# Patient Record
Sex: FEMALE | Race: WHITE | NOT HISPANIC OR LATINO | Employment: FULL TIME | URBAN - METROPOLITAN AREA
[De-identification: names, ages, dates, MRNs, and addresses within clinical notes are randomized per-mention and may not be internally consistent; named-entity substitution may affect disease eponyms.]

---

## 2014-02-21 LAB — PAP SMEAR - HIM PATIENT REPORTED: NEGATIVE

## 2017-11-10 ENCOUNTER — AMBULATORY - HEALTHEAST (OUTPATIENT)
Dept: MULTI SPECIALTY CLINIC | Facility: CLINIC | Age: 27
End: 2017-11-10

## 2017-11-10 LAB
ABO + RH BLD: NORMAL
ABO + RH BLD: NORMAL
BLD GP AB SCN SERPL QL: NEGATIVE
C TRACH DNA SPEC QL PROBE+SIG AMP: NEGATIVE
HBA1C MFR BLD: 5.1 % (ref 0–5.7)
HBV SURFACE AG SERPL QL IA: NEGATIVE
HCT VFR BLD AUTO: 40.6 %
HEMOGLOBIN: 13.9 G/DL (ref 11.7–15.7)
HIV 1+2 AB+HIV1 P24 AG SERPL QL IA: NEGATIVE
N GONORRHOEA DNA SPEC QL PROBE+SIG AMP: NEGATIVE
PAP SMEAR - HIM PATIENT REPORTED: NORMAL
PAP-ABSTRACT: NORMAL
PLATELET # BLD AUTO: 381 10^9/L
RUBELLA ABY IGG: NORMAL
SPECIMEN DESCRIP: NORMAL
SPECIMEN DESCRIPTION: NORMAL
T PALLIDUM IGG SER QL: NORMAL

## 2017-12-03 ENCOUNTER — HEALTH MAINTENANCE LETTER (OUTPATIENT)
Age: 27
End: 2017-12-03

## 2018-01-26 ENCOUNTER — PRE VISIT (OUTPATIENT)
Dept: MATERNAL FETAL MEDICINE | Facility: CLINIC | Age: 28
End: 2018-01-26

## 2018-02-02 ENCOUNTER — HOSPITAL ENCOUNTER (OUTPATIENT)
Dept: ULTRASOUND IMAGING | Facility: CLINIC | Age: 28
Discharge: HOME | End: 2018-02-02
Attending: OBSTETRICS & GYNECOLOGY | Admitting: OBSTETRICS & GYNECOLOGY
Payer: COMMERCIAL

## 2018-02-02 ENCOUNTER — OFFICE VISIT (OUTPATIENT)
Dept: MATERNAL FETAL MEDICINE | Facility: CLINIC | Age: 28
End: 2018-02-02
Attending: OBSTETRICS & GYNECOLOGY
Payer: COMMERCIAL

## 2018-02-02 ENCOUNTER — HOSPITAL ENCOUNTER (OUTPATIENT)
Dept: ULTRASOUND IMAGING | Facility: CLINIC | Age: 28
End: 2018-02-02
Attending: OBSTETRICS & GYNECOLOGY
Payer: COMMERCIAL

## 2018-02-02 DIAGNOSIS — O26.90 PREGNANCY RELATED CONDITION, UNSPECIFIED TRIMESTER: ICD-10-CM

## 2018-02-02 DIAGNOSIS — O09.812 PREGNANCY RESULTING FROM IN VITRO FERTILIZATION, SECOND TRIMESTER: Primary | ICD-10-CM

## 2018-02-02 PROCEDURE — 76811 OB US DETAILED SNGL FETUS: CPT

## 2018-02-02 PROCEDURE — 76825 ECHO EXAM OF FETAL HEART: CPT

## 2018-02-02 NOTE — PROGRESS NOTES
Please see full imaging report from ViewPoint program under imaging tab.    Normal fetal anatomic survey and fetal echo.     Deanne Kerr MD  Maternal Fetal Medicine

## 2018-02-02 NOTE — MR AVS SNAPSHOT
After Visit Summary   2/2/2018    Vivien Quinn    MRN: [id number]           Patient Information     Date Of Birth          1990        Visit Information        Provider Department      2/2/2018 12:15 PM Deanne Kerr MD [facility] Maternal Fetal Medicine [facility]        Today's Diagnoses     Pregnancy resulting from in vitro fertilization, second trimester    -  1       Follow-ups after your visit        Who to contact     If you have questions or need follow up information about today's clinic visit or your schedule please contact [facility] MATERNAL FETAL MEDICINE [facility] directly at [phone].  Normal or non-critical lab and imaging results will be communicated to you by "[employer]"hart, letter or phone within 4 business days after the clinic has received the results. If you do not hear from us within 7 days, please contact the clinic through [employer] Son[employer] or phone. If you have a critical or abnormal lab result, we will notify you by phone as soon as possible.  Submit refill requests through Spreetales or call your pharmacy and they will forward the refill request to us. Please allow 3 business days for your refill to be completed.          Additional Information About Your Visit        MyChart Information     Spreetales gives you secure access to your electronic health record. If you see a primary care provider, you can also send messages to your care team and make appointments. If you have questions, please call your primary care clinic.  If you do not have a primary care provider, please call [phone] and they will assist you.        Care EveryWhere ID     This is your Care EveryWhere ID. This could be used by other organizations to access your [place] medical records  [id number]        Your Vitals Were     Last Period                   09/04/2017            Blood Pressure from Last 3 Encounters:   10/28/15 132/78   02/02/15 98/60   08/16/13 126/76    Weight from Last 3 Encounters:   10/28/15  70.2 kg (154 lb 12.8 oz)   02/02/15 65.3 kg (144 lb)   08/16/13 66.7 kg (147 lb)              Today, you had the following     No orders found for display       Primary Care Provider Office Phone # Fax #    KIKI Daigle [phone] [address] [facility] [address]        Equal Access to Services     TROY BACA : Hadii aad ku hadasho Soomaali, waaxda luqadaha, qaybta kaalmada adeegyada, waxay idiin hayaan adeeg kharash laesperanza . So [facility] [phone].    ATENCIÓN: Si shabbirla espprachi, tiene a patel disposición servicios gratuitos de asistencia lingüística. Llame al [phone].    We comply with applicable federal civil rights laws and [place] laws. We do not discriminate on the basis of race, color, national origin, age, disability, sex, sexual orientation, or gender identity.            Thank you!     Thank you for choosing [facility] MATERNAL FETAL MEDICINE [facility]  for your care. Our goal is always to provide you with excellent care. Hearing back from our patients is one way we can continue to improve our services. Please take a few minutes to complete the written survey that you may receive in the mail after your visit with us. Thank you!             Your Updated Medication List - Protect others around you: Learn how to safely use, store and throw away your medicines at www.disposemymeds.org.          This list is accurate as of 2/2/18  1:22 PM.  Always use your most recent med list.                   Brand Name Dispense Instructions for use Diagnosis    hydrOXYzine 10 MG tablet    ATARAX     Take 10 mg by mouth 3 times daily as needed for itching        MICROGESTIN 1-20 MG-MCG per tablet   Generic drug:  norethindrone-ethinyl estradiol      Take 1 tablet by mouth daily    Routine general medical examination at a health care facility       PREVACID 24HR PO      2 in am 2 in pm    Routine general medical examination at a health care facility       sertraline 25 MG tablet     ZOLOFT     Take 25 mg by mouth daily

## 2018-02-08 ENCOUNTER — OFFICE VISIT - HEALTHEAST (OUTPATIENT)
Dept: FAMILY MEDICINE | Facility: CLINIC | Age: 28
End: 2018-02-08

## 2018-02-08 DIAGNOSIS — H00.019 STYE: ICD-10-CM

## 2018-03-16 LAB
GLU GEST SCREEN 1HR 50G: 106
HEMOGLOBIN: 11.2 G/DL (ref 11.7–15.7)
T PALLIDUM IGG SER QL: NORMAL

## 2018-04-13 ENCOUNTER — TRANSFERRED RECORDS (OUTPATIENT)
Dept: FAMILY MEDICINE | Facility: CLINIC | Age: 28
End: 2018-04-13

## 2018-05-10 LAB — GROUP B STREP PCR: NEGATIVE

## 2018-06-12 ENCOUNTER — HOSPITAL ENCOUNTER (INPATIENT)
Facility: CLINIC | Age: 28
LOS: 3 days | Discharge: HOME | End: 2018-06-15
Attending: REGISTERED NURSE | Admitting: REGISTERED NURSE
Payer: COMMERCIAL

## 2018-06-12 ENCOUNTER — ANESTHESIA (OUTPATIENT)
Dept: OBGYN | Facility: CLINIC | Age: 28
End: 2018-06-12
Payer: COMMERCIAL

## 2018-06-12 ENCOUNTER — ANESTHESIA EVENT (OUTPATIENT)
Dept: OBGYN | Facility: CLINIC | Age: 28
End: 2018-06-12
Payer: COMMERCIAL

## 2018-06-12 LAB
ABO + RH BLD: NORMAL
ABO + RH BLD: NORMAL
RUPTURE OF FETAL MEMBRANES BY ROM PLUS: POSITIVE
SPECIMEN EXP DATE BLD: NORMAL
T PALLIDUM AB SER QL: NONREACTIVE

## 2018-06-12 PROCEDURE — 25000132 ZZH RX MED GY IP 250 OP 250 PS 637: Performed by: REGISTERED NURSE

## 2018-06-12 PROCEDURE — 3E0R3BZ INTRODUCTION OF ANESTHETIC AGENT INTO SPINAL CANAL, PERCUTANEOUS APPROACH: ICD-10-PCS | Performed by: ANESTHESIOLOGY

## 2018-06-12 PROCEDURE — 59025 FETAL NON-STRESS TEST: CPT

## 2018-06-12 PROCEDURE — 25000128 H RX IP 250 OP 636: Performed by: REGISTERED NURSE

## 2018-06-12 PROCEDURE — 36415 COLL VENOUS BLD VENIPUNCTURE: CPT | Performed by: REGISTERED NURSE

## 2018-06-12 PROCEDURE — G0463 HOSPITAL OUTPT CLINIC VISIT: HCPCS | Mod: 25

## 2018-06-12 PROCEDURE — 27110038 ZZH RX 271: Performed by: ANESTHESIOLOGY

## 2018-06-12 PROCEDURE — 86900 BLOOD TYPING SEROLOGIC ABO: CPT | Performed by: REGISTERED NURSE

## 2018-06-12 PROCEDURE — 86901 BLOOD TYPING SEROLOGIC RH(D): CPT | Performed by: REGISTERED NURSE

## 2018-06-12 PROCEDURE — 25000128 H RX IP 250 OP 636: Performed by: ANESTHESIOLOGY

## 2018-06-12 PROCEDURE — 84112 EVAL AMNIOTIC FLUID PROTEIN: CPT | Performed by: REGISTERED NURSE

## 2018-06-12 PROCEDURE — 12000029 ZZH R&B OB INTERMEDIATE

## 2018-06-12 PROCEDURE — 37000011 ZZH ANESTHESIA WARD SERVICE

## 2018-06-12 PROCEDURE — 00HU33Z INSERTION OF INFUSION DEVICE INTO SPINAL CANAL, PERCUTANEOUS APPROACH: ICD-10-PCS | Performed by: ANESTHESIOLOGY

## 2018-06-12 PROCEDURE — 86780 TREPONEMA PALLIDUM: CPT | Performed by: REGISTERED NURSE

## 2018-06-12 RX ORDER — SODIUM CHLORIDE, SODIUM LACTATE, POTASSIUM CHLORIDE, CALCIUM CHLORIDE 600; 310; 30; 20 MG/100ML; MG/100ML; MG/100ML; MG/100ML
INJECTION, SOLUTION INTRAVENOUS CONTINUOUS
Status: DISCONTINUED | OUTPATIENT
Start: 2018-06-12 | End: 2018-06-13

## 2018-06-12 RX ORDER — ONDANSETRON 2 MG/ML
4 INJECTION INTRAMUSCULAR; INTRAVENOUS EVERY 6 HOURS PRN
Status: DISCONTINUED | OUTPATIENT
Start: 2018-06-12 | End: 2018-06-13

## 2018-06-12 RX ORDER — CARBOPROST TROMETHAMINE 250 UG/ML
250 INJECTION, SOLUTION INTRAMUSCULAR
Status: DISCONTINUED | OUTPATIENT
Start: 2018-06-12 | End: 2018-06-13

## 2018-06-12 RX ORDER — ACETAMINOPHEN 325 MG/1
650 TABLET ORAL EVERY 4 HOURS PRN
Status: DISCONTINUED | OUTPATIENT
Start: 2018-06-12 | End: 2018-06-13

## 2018-06-12 RX ORDER — FENTANYL CITRATE 50 UG/ML
50-100 INJECTION, SOLUTION INTRAMUSCULAR; INTRAVENOUS
Status: DISCONTINUED | OUTPATIENT
Start: 2018-06-12 | End: 2018-06-13

## 2018-06-12 RX ORDER — OXYTOCIN 10 [USP'U]/ML
10 INJECTION, SOLUTION INTRAMUSCULAR; INTRAVENOUS
Status: DISCONTINUED | OUTPATIENT
Start: 2018-06-12 | End: 2018-06-13

## 2018-06-12 RX ORDER — OXYTOCIN/0.9 % SODIUM CHLORIDE 30/500 ML
100-340 PLASTIC BAG, INJECTION (ML) INTRAVENOUS CONTINUOUS PRN
Status: COMPLETED | OUTPATIENT
Start: 2018-06-12 | End: 2018-06-13

## 2018-06-12 RX ORDER — PANTOPRAZOLE SODIUM 40 MG/1
40 TABLET, DELAYED RELEASE ORAL
Status: DISCONTINUED | OUTPATIENT
Start: 2018-06-12 | End: 2018-06-15 | Stop reason: HOSPADM

## 2018-06-12 RX ORDER — OXYCODONE AND ACETAMINOPHEN 5; 325 MG/1; MG/1
1 TABLET ORAL
Status: DISCONTINUED | OUTPATIENT
Start: 2018-06-12 | End: 2018-06-13

## 2018-06-12 RX ORDER — PRENATAL VIT/IRON FUM/FOLIC AC 27MG-0.8MG
1 TABLET ORAL DAILY
COMMUNITY

## 2018-06-12 RX ORDER — IBUPROFEN 400 MG/1
800 TABLET, FILM COATED ORAL
Status: COMPLETED | OUTPATIENT
Start: 2018-06-12 | End: 2018-06-13

## 2018-06-12 RX ORDER — FENTANYL CITRATE 50 UG/ML
100 INJECTION, SOLUTION INTRAMUSCULAR; INTRAVENOUS ONCE
Status: COMPLETED | OUTPATIENT
Start: 2018-06-12 | End: 2018-06-12

## 2018-06-12 RX ORDER — ONDANSETRON 4 MG/1
4 TABLET, ORALLY DISINTEGRATING ORAL EVERY 6 HOURS PRN
Status: DISCONTINUED | OUTPATIENT
Start: 2018-06-12 | End: 2018-06-13

## 2018-06-12 RX ORDER — METHYLERGONOVINE MALEATE 0.2 MG/ML
200 INJECTION INTRAVENOUS
Status: DISCONTINUED | OUTPATIENT
Start: 2018-06-12 | End: 2018-06-13

## 2018-06-12 RX ORDER — ROPIVACAINE HYDROCHLORIDE 2 MG/ML
10 INJECTION, SOLUTION EPIDURAL; INFILTRATION; PERINEURAL ONCE
Status: COMPLETED | OUTPATIENT
Start: 2018-06-12 | End: 2018-06-12

## 2018-06-12 RX ORDER — EPHEDRINE SULFATE 50 MG/ML
5 INJECTION, SOLUTION INTRAMUSCULAR; INTRAVENOUS; SUBCUTANEOUS
Status: DISCONTINUED | OUTPATIENT
Start: 2018-06-12 | End: 2018-06-13

## 2018-06-12 RX ORDER — SERTRALINE HYDROCHLORIDE 25 MG/1
25 TABLET, FILM COATED ORAL EVERY EVENING
Status: DISCONTINUED | OUTPATIENT
Start: 2018-06-12 | End: 2018-06-15 | Stop reason: HOSPADM

## 2018-06-12 RX ORDER — LIDOCAINE 40 MG/G
CREAM TOPICAL
Status: DISCONTINUED | OUTPATIENT
Start: 2018-06-12 | End: 2018-06-13

## 2018-06-12 RX ORDER — NALOXONE HYDROCHLORIDE 0.4 MG/ML
.1-.4 INJECTION, SOLUTION INTRAMUSCULAR; INTRAVENOUS; SUBCUTANEOUS
Status: DISCONTINUED | OUTPATIENT
Start: 2018-06-12 | End: 2018-06-13

## 2018-06-12 RX ORDER — NALBUPHINE HYDROCHLORIDE 10 MG/ML
2.5-5 INJECTION, SOLUTION INTRAMUSCULAR; INTRAVENOUS; SUBCUTANEOUS EVERY 6 HOURS PRN
Status: DISCONTINUED | OUTPATIENT
Start: 2018-06-12 | End: 2018-06-13

## 2018-06-12 RX ADMIN — SODIUM CHLORIDE, POTASSIUM CHLORIDE, SODIUM LACTATE AND CALCIUM CHLORIDE 1000 ML: 600; 310; 30; 20 INJECTION, SOLUTION INTRAVENOUS at 21:44

## 2018-06-12 RX ADMIN — ROPIVACAINE HYDROCHLORIDE 8 ML: 2 INJECTION, SOLUTION EPIDURAL; INFILTRATION at 22:45

## 2018-06-12 RX ADMIN — PANTOPRAZOLE SODIUM 40 MG: 40 TABLET, DELAYED RELEASE ORAL at 23:08

## 2018-06-12 RX ADMIN — FENTANYL CITRATE 100 MCG: 50 INJECTION, SOLUTION INTRAMUSCULAR; INTRAVENOUS at 22:45

## 2018-06-12 RX ADMIN — Medication 12 ML/HR: at 22:51

## 2018-06-12 RX ADMIN — SODIUM CHLORIDE, POTASSIUM CHLORIDE, SODIUM LACTATE AND CALCIUM CHLORIDE: 600; 310; 30; 20 INJECTION, SOLUTION INTRAVENOUS at 23:00

## 2018-06-12 RX ADMIN — FENTANYL CITRATE 100 MCG: 50 INJECTION, SOLUTION INTRAMUSCULAR; INTRAVENOUS at 21:52

## 2018-06-12 NOTE — IP AVS SNAPSHOT
MRN:[id number]                      After Visit Summary   6/12/2018    Vivien Quinn    MRN: [id number]           Thank you!     Thank you for choosing [facility] for your care. Our goal is always to provide you with excellent care. Hearing back from our patients is one way we can continue to improve our services. Please take a few minutes to complete the written survey that you may receive in the mail after you visit with us. Thank you!        Patient Information     Date Of Birth          1990        About your hospital stay     You were admitted on:  June 12, 2018 You last received care in the:  [address]    You were discharged on:  Monik 15, 2018        Reason for your hospital stay       Maternity care                  Who to Call     For medical emergencies, please call 911.  For non-urgent questions about your medical care, please call your primary care provider or clinic, [phone]          Attending Provider     Provider Specialty    Taylor Brand APRN CN[facility]wives    [facility], KELLY Chau CNM Midwives       Primary Care Provider Office Phone # Fax #    KIKI Daigle [phone]      After Care Instructions     Activity       Review discharge instructions            Diet       Resume previous diet            Discharge Instructions - Postpartum visit       Schedule postpartum visit with your provider and return to clinic in 6 weeks or sooner as needed.                  Follow-up Appointments     Follow Up and recommended labs and tests       Schedule visit in clinic at 6 weeks postpartum or sooner as needed.                  Further instructions from your care team       Postpartum Vaginal Delivery Instructions    Activity       Ask family and friends for help when you need it.    Do not place anything in your vagina for 6 weeks.    You are not restricted on other activities, but take it easy for a few  weeks to allow your body to recover from delivery.  You are able to do any activities you feel up to that point.    No driving until you have stopped taking your pain medications (usually two weeks after delivery).     Call your health care provider if you have any of these symptoms:       Increased pain, swelling, redness, or fluid around your stiches from an episiotomy or perineal tear.    A fever above 100.4 F (38 C) with or without chills when placing a thermometer under your tongue.    You soak a sanitary pad with blood within 1 hour, or you see blood clots larger than a golf ball.    Bleeding that lasts more than 6 weeks.    Vaginal discharge that smells bad.    Severe pain, cramping or tenderness in your lower belly area.    A need to urinate more frequently (use the toilet more often), more urgently (use the toilet very quickly), or it burns when you urinate.    Nausea and vomiting.    Redness, swelling or pain around a vein in your leg.    Problems breastfeeding or a red or painful area on your breast.    Chest pain and cough or are gasping for air.    Problems coping with sadness, anxiety, or depression.  If you have any concerns about hurting yourself or the baby, call your provider immediately.     You have questions or concerns after you return home.     Keep your hands clean:  Always wash your hands before touching your perineal area and stitches.  This helps reduce your risk of infection.  If your hands aren't dirty, you may use an alcohol hand-rub to clean your hands. Keep your nails clean and short.        Pending Results     No orders found from 6/10/2018 to 6/13/2018.            Statement of Approval     Ordered          06/15/18 0823  I have reviewed and agree with all the recommendations and orders detailed in this document.  EFFECTIVE NOW     Approved and electronically signed by:  Daisy Sheridan APRN CNM             Admission Information     Date & Time Provider Department Dept.  "Phone    6/12/2018 Daisy Sheridan, KELLY CNM [address] [phone]      Your Vitals Were     Blood Pressure Pulse Temperature Respirations Height Weight    116/72 79 98.3  F (36.8  C) (Oral) 16 1.702 m (5' 7\") 77.6 kg (171 lb)    Last Period Pulse Oximetry BMI (Body Mass Index)             09/04/2017 97% 26.78 kg/m2         piSociety Information     piSociety gives you secure access to your electronic health record. If you see a primary care provider, you can also send messages to your care team and make appointments. If you have questions, please call your primary care clinic.  If you do not have a primary care provider, please call [phone] and they will assist you.        Care EveryWhere ID     This is your Care EveryWhere ID. This could be used by other organizations to access your [facility] medical records  [id number]        Equal Access to Services     TROY BACA AH: Ramiro Kern, willie marquez, qayehuda kaaljose caldwell, eleazar orourke. So [facility] [phone].    ATENCIÓN: Si habla español, tiene a patel disposición servicios gratuitos de asistencia lingüística. Llame al [phone].    We comply with applicable federal civil rights laws and [place] laws. We do not discriminate on the basis of race, color, national origin, age, disability, sex, sexual orientation, or gender identity.               Review of your medicines      START taking        Dose / Directions    acetaminophen 325 MG tablet   Commonly known as:  TYLENOL        Dose:  650 mg   Take 2 tablets (650 mg) by mouth every 6 hours as needed for mild pain, fever or pain (greater than or equal to 38? C /100.4? F (oral) or 38.5? C/ 101.4? F (core).)   Quantity:  100 tablet   Refills:  0       ibuprofen 600 MG tablet   Commonly known as:  ADVIL/MOTRIN        Dose:  600 mg   Take 1 tablet (600 mg) by mouth every 6 hours as needed for moderate pain or other (cramping) "   Refills:  0       oxyCODONE IR 5 MG tablet   Commonly known as:  ROXICODONE        Dose:  5 mg   Take 1 tablet (5 mg) by mouth every 4 hours as needed for moderate to severe pain   Quantity:  15 tablet   Refills:  0       senna-docusate 8.6-50 MG per tablet   Commonly known as:  SENOKOT-S;PERICOLACE        Dose:  1 tablet   Take 1 tablet by mouth 2 times daily as needed for constipation   Quantity:  100 tablet   Refills:  0         CONTINUE these medicines which have NOT CHANGED        Dose / Directions    PANTOPRAZOLE SODIUM PO        Dose:  40 mg   Take 40 mg by mouth 2 times daily (before meals)   Refills:  0       prenatal multivitamin plus iron 27-0.8 MG Tabs per tablet        Dose:  1 tablet   Take 1 tablet by mouth daily   Refills:  0       sertraline 25 MG tablet   Commonly known as:  ZOLOFT        Dose:  25 mg   Take 25 mg by mouth daily   Refills:  0            Where to get your medicines      Some of these will need a paper prescription and others can be bought over the counter. Ask your nurse if you have questions.     Bring a paper prescription for each of these medications     oxyCODONE IR 5 MG tablet       You don't need a prescription for these medications     acetaminophen 325 MG tablet    ibuprofen 600 MG tablet    senna-docusate 8.6-50 MG per tablet                Protect others around you: Learn how to safely use, store and throw away your medicines at www.disposemymeds.org.        Information about OPIOIDS     PRESCRIPTION OPIOIDS: WHAT YOU NEED TO KNOW   We gave you an opioid (narcotic) pain medicine. It is important to manage your pain, but opioids are not always the best choice. You should first try all the other options your care team gave you. Take this medicine for as short a time (and as few doses) as possible.     These medicines have risks:    DO NOT drive when on new or higher doses of pain medicine. These medicines can affect your alertness and reaction times, and you could be  arrested for driving under the influence (DUI). If you need to use opioids long-term, talk to your care team about driving.    DO NOT operate heave machinery    DO NOT do any other dangerous activities while taking these medicines.     DO NOT drink any alcohol while taking these medicines.      If the opioid prescribed includes acetaminophen, DO NOT take with any other medicines that contain acetaminophen. Read all labels carefully. Look for the word  acetaminophen  or  Tylenol.  Ask your pharmacist if you have questions or are unsure.    You can get addicted to pain medicines, especially if you have a history of addiction (chemical, alcohol or substance dependence). Talk to your care team about ways to reduce this risk.    Store your pills in a secure place, locked if possible. We will not replace any lost or stolen medicine. If you don t finish your medicine, please throw away (dispose) as directed by your pharmacist. The [place] Pollution Control Agency has more information about safe disposal: https://www.pca.[facility].[place].us/living-green/managing-unwanted-medications.     All opioids tend to cause constipation. Drink plenty of water and eat foods that have a lot of fiber, such as fruits, vegetables, prune juice, apple juice and high-fiber cereal. Take a laxative (Miralax, milk of magnesia, Colace, Senna) if you don t move your bowels at least every other day.              Medication List: This is a list of all your medications and when to take them. Check marks below indicate your daily home schedule. Keep this list as a reference.      Medications           Morning Afternoon Evening Bedtime As Needed    acetaminophen 325 MG tablet   Commonly known as:  TYLENOL   Take 2 tablets (650 mg) by mouth every 6 hours as needed for mild pain, fever or pain (greater than or equal to 38? C /100.4? F (oral) or 38.5? C/ 101.4? F (core).)   Last time this was given:  650 mg on 6/15/2018  9:40 AM                                 ibuprofen 600 MG tablet   Commonly known as:  ADVIL/MOTRIN   Take 1 tablet (600 mg) by mouth every 6 hours as needed for moderate pain or other (cramping)   Last time this was given:  800 mg on 6/15/2018  9:40 AM                                oxyCODONE IR 5 MG tablet   Commonly known as:  ROXICODONE   Take 1 tablet (5 mg) by mouth every 4 hours as needed for moderate to severe pain   Last time this was given:  5 mg on 6/15/2018  9:41 AM                                PANTOPRAZOLE SODIUM PO   Take 40 mg by mouth 2 times daily (before meals)   Last time this was given:  40 mg on 6/15/2018  9:40 AM                                prenatal multivitamin plus iron 27-0.8 MG Tabs per tablet   Take 1 tablet by mouth daily                                senna-docusate 8.6-50 MG per tablet   Commonly known as:  SENOKOT-S;PERICOLACE   Take 1 tablet by mouth 2 times daily as needed for constipation   Last time this was given:  1 tablet on 6/15/2018  9:41 AM                                sertraline 25 MG tablet   Commonly known as:  ZOLOFT   Take 25 mg by mouth daily   Last time this was given:  25 mg on 6/14/2018  8:10 PM

## 2018-06-12 NOTE — IP AVS SNAPSHOT
[address]    [place] [address]    Phone:  [phone]                                       After Visit Summary   6/12/2018    Vivien Quinn    MRN: [id number]           After Visit Summary Signature Page     I have received my discharge instructions, and my questions have been answered. I have discussed any challenges I see with this plan with the nurse or doctor.    ..........................................................................................................................................  Patient/Patient Representative Signature      ..........................................................................................................................................  Patient Representative Print Name and Relationship to Patient    ..................................................               ................................................  Date                                            Time    ..........................................................................................................................................  Reviewed by Signature/Title    ...................................................              ..............................................  Date                                                            Time

## 2018-06-12 NOTE — PLAN OF CARE
Taylor MULLENM at bedside to discuss plan of care. Plan per provider is continue coping with repositioning and nitrous oxide, plan for CNM to reassess cervix at 1930. Plan to page Taylor if patient requests epidural before 1930. Plan agreed upon with patient and spouse.

## 2018-06-12 NOTE — H&P
[facility] Labor and Delivery History and Physical    Vivien Quinn MRN# [id number]   Age: 27 year old YOB: 1990     Date of Admission:  2018    Primary care provider: Prisca House           Chief Complaint:   Vivien Quinn is a 27 year old  who is 40w4d pregnant today and being admitted for labor management and SROM. Pt started having painful ctxs at 0400 this morning and then had gush of clear fluid at 0730 this morning. ROM PLus was positive for amniotic fluid. Pt was ctxing painfully but irregularly on admission and was 1/70/-2, posterior per RN report. Category I tracing in triage. Intermittent auscultation since admission has revealed normal FHTs.           Pregnancy history:     OBSTETRIC HISTORY: IVF pregnancy, Normal Level II US.     Obstetric History       T0      L0     SAB0   TAB0   Ectopic0   Multiple0   Live Births0       # Outcome Date GA Lbr Sanjay/2nd Weight Sex Delivery Anes PTL Lv   1 Current                   EDC: Estimated Date of Delivery: 2018    Prenatal Labs:   Lab Results   Component Value Date    ABO O 2018    RH Pos 2018    AS negative 11/10/2017    HEPBANG Negative 11/10/2017    CHPCRT Negative 11/10/2017    GCPCRT Negative 11/10/2017    TREPAB non-reactive 2018    RUBELLAABIGG IMMUNE 11/10/2017    HGB 11.2 (A) 2018       GBS Status:   Lab Results   Component Value Date    GBS negative 05/10/2018       Active Problem List  Patient Active Problem List   Diagnosis     Health Care Home     Esophageal reflux     Generalized anxiety disorder     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        Maternal Past Medical History:     Past Medical History:   Diagnosis Date     ADD (attention deficit disorder)     Concerta     Anxiety     Celexa     Gastric polyp 2015    per EGD and bx benign     GERD (gastroesophageal reflux disease) 2015    EGD     Migraine     none since highschool     PCOS (polycystic ovarian syndrome)                           Social History:   I have reviewed this patient's social history          Physical Exam:   Vitals were reviewed  Blood pressure 134/60, pulse 70, temperature 97.8  F (36.6  C), temperature source Oral, resp. rate 18, height 1.702 m (5' 7\"), weight 77.6 kg (171 lb), last menstrual period 09/04/2017.  Constitutional:   awake, alert, cooperative, no apparent distress, and appears stated age      Cervix:   Membranes: SROM  clear amniotic fluid   Dilation: 2   Effacement: 70%   Station:-2   Consistency: average   Position: Posterior  Presentation:Cephalic  FHTs: Normal auscultation per pt report.   Ctxs: q2-5min. Pt is using NO for pain control.                        Assessment:   Vivien Quinn is a 40w4d pregnant female admitted with labor management, SROM.          Plan:   Admit - see IP orders  Pt seems to be in early labor. She has changed from 1 to 2cm in 6 hours. Will recheck cervix thisevening if not indicated sooner.      Taylor Brand, KELLY MULLENM  "

## 2018-06-12 NOTE — PLAN OF CARE
Pt and  present to [facility].  Pt is  pt of Taylor Brand CNM who is 40w2d c/o annmarie and leaking fluid since 0730 this morning.    External monitors applied with verbal consent.  Orders received.  Prenatal and medical history reviewed.    ROM + collected and sent to lab.      NST reactive, ROM+ positive, cervix 1.5/70%/-2, vertex.  Taylor Brand updated.  Orders received to admit pt and allow her to ambulate in hallways.  Transferred to room 234 at 0955.

## 2018-06-13 PROCEDURE — 0KQM0ZZ REPAIR PERINEUM MUSCLE, OPEN APPROACH: ICD-10-PCS | Performed by: ADVANCED PRACTICE MIDWIFE

## 2018-06-13 PROCEDURE — 25000132 ZZH RX MED GY IP 250 OP 250 PS 637: Performed by: REGISTERED NURSE

## 2018-06-13 PROCEDURE — 25000132 ZZH RX MED GY IP 250 OP 250 PS 637: Performed by: ADVANCED PRACTICE MIDWIFE

## 2018-06-13 PROCEDURE — 72200001 ZZH LABOR CARE VAGINAL DELIVERY SINGLE

## 2018-06-13 PROCEDURE — 25000128 H RX IP 250 OP 636: Performed by: REGISTERED NURSE

## 2018-06-13 PROCEDURE — 25000128 H RX IP 250 OP 636

## 2018-06-13 PROCEDURE — 12000037 ZZH R&B POSTPARTUM INTERMEDIATE

## 2018-06-13 PROCEDURE — 25000125 ZZHC RX 250: Performed by: REGISTERED NURSE

## 2018-06-13 RX ORDER — LANOLIN 100 %
OINTMENT (GRAM) TOPICAL
Status: DISCONTINUED | OUTPATIENT
Start: 2018-06-13 | End: 2018-06-15 | Stop reason: HOSPADM

## 2018-06-13 RX ORDER — LIDOCAINE 40 MG/G
CREAM TOPICAL
Status: DISCONTINUED | OUTPATIENT
Start: 2018-06-13 | End: 2018-06-13

## 2018-06-13 RX ORDER — IBUPROFEN 400 MG/1
800 TABLET, FILM COATED ORAL EVERY 6 HOURS PRN
Status: DISCONTINUED | OUTPATIENT
Start: 2018-06-13 | End: 2018-06-15 | Stop reason: HOSPADM

## 2018-06-13 RX ORDER — AMOXICILLIN 250 MG
1 CAPSULE ORAL 2 TIMES DAILY
Status: DISCONTINUED | OUTPATIENT
Start: 2018-06-13 | End: 2018-06-15 | Stop reason: HOSPADM

## 2018-06-13 RX ORDER — OXYTOCIN/0.9 % SODIUM CHLORIDE 30/500 ML
100 PLASTIC BAG, INJECTION (ML) INTRAVENOUS CONTINUOUS
Status: DISCONTINUED | OUTPATIENT
Start: 2018-06-13 | End: 2018-06-15 | Stop reason: HOSPADM

## 2018-06-13 RX ORDER — CALCIUM CARBONATE 500 MG/1
500-1000 TABLET, CHEWABLE ORAL
Status: DISCONTINUED | OUTPATIENT
Start: 2018-06-13 | End: 2018-06-13

## 2018-06-13 RX ORDER — BISACODYL 10 MG
10 SUPPOSITORY, RECTAL RECTAL DAILY PRN
Status: DISCONTINUED | OUTPATIENT
Start: 2018-06-15 | End: 2018-06-15 | Stop reason: HOSPADM

## 2018-06-13 RX ORDER — OXYCODONE HYDROCHLORIDE 5 MG/1
5-10 TABLET ORAL EVERY 4 HOURS PRN
Status: DISCONTINUED | OUTPATIENT
Start: 2018-06-13 | End: 2018-06-15 | Stop reason: HOSPADM

## 2018-06-13 RX ORDER — ACETAMINOPHEN 325 MG/1
650 TABLET ORAL EVERY 4 HOURS PRN
Status: DISCONTINUED | OUTPATIENT
Start: 2018-06-13 | End: 2018-06-15 | Stop reason: HOSPADM

## 2018-06-13 RX ORDER — HYDROCORTISONE 2.5 %
CREAM (GRAM) TOPICAL 3 TIMES DAILY PRN
Status: DISCONTINUED | OUTPATIENT
Start: 2018-06-13 | End: 2018-06-15 | Stop reason: HOSPADM

## 2018-06-13 RX ORDER — MISOPROSTOL 200 UG/1
400 TABLET ORAL
Status: DISCONTINUED | OUTPATIENT
Start: 2018-06-13 | End: 2018-06-15 | Stop reason: HOSPADM

## 2018-06-13 RX ORDER — OXYTOCIN/0.9 % SODIUM CHLORIDE 30/500 ML
1-24 PLASTIC BAG, INJECTION (ML) INTRAVENOUS CONTINUOUS
Status: DISCONTINUED | OUTPATIENT
Start: 2018-06-13 | End: 2018-06-13

## 2018-06-13 RX ORDER — NALOXONE HYDROCHLORIDE 0.4 MG/ML
.1-.4 INJECTION, SOLUTION INTRAMUSCULAR; INTRAVENOUS; SUBCUTANEOUS
Status: DISCONTINUED | OUTPATIENT
Start: 2018-06-13 | End: 2018-06-15 | Stop reason: HOSPADM

## 2018-06-13 RX ORDER — NALBUPHINE HYDROCHLORIDE 10 MG/ML
INJECTION, SOLUTION INTRAMUSCULAR; INTRAVENOUS; SUBCUTANEOUS
Status: COMPLETED
Start: 2018-06-13 | End: 2018-06-13

## 2018-06-13 RX ORDER — OXYTOCIN 10 [USP'U]/ML
10 INJECTION, SOLUTION INTRAMUSCULAR; INTRAVENOUS
Status: DISCONTINUED | OUTPATIENT
Start: 2018-06-13 | End: 2018-06-15 | Stop reason: HOSPADM

## 2018-06-13 RX ORDER — AMOXICILLIN 250 MG
2 CAPSULE ORAL 2 TIMES DAILY
Status: DISCONTINUED | OUTPATIENT
Start: 2018-06-13 | End: 2018-06-15 | Stop reason: HOSPADM

## 2018-06-13 RX ORDER — OXYTOCIN/0.9 % SODIUM CHLORIDE 30/500 ML
340 PLASTIC BAG, INJECTION (ML) INTRAVENOUS CONTINUOUS PRN
Status: DISCONTINUED | OUTPATIENT
Start: 2018-06-13 | End: 2018-06-15 | Stop reason: HOSPADM

## 2018-06-13 RX ADMIN — CALCIUM CARBONATE (ANTACID) CHEW TAB 500 MG 1000 MG: 500 CHEW TAB at 00:19

## 2018-06-13 RX ADMIN — OXYTOCIN-SODIUM CHLORIDE 0.9% IV SOLN 30 UNIT/500ML 2 MILLI-UNITS/MIN: 30-0.9/5 SOLUTION at 00:25

## 2018-06-13 RX ADMIN — PANTOPRAZOLE SODIUM 40 MG: 40 TABLET, DELAYED RELEASE ORAL at 09:35

## 2018-06-13 RX ADMIN — OXYCODONE HYDROCHLORIDE 5 MG: 5 TABLET ORAL at 21:53

## 2018-06-13 RX ADMIN — CALCIUM CARBONATE (ANTACID) CHEW TAB 500 MG 1000 MG: 500 CHEW TAB at 08:59

## 2018-06-13 RX ADMIN — IBUPROFEN 800 MG: 400 TABLET ORAL at 18:40

## 2018-06-13 RX ADMIN — NALBUPHINE HYDROCHLORIDE 2.5 MG: 10 INJECTION, SOLUTION INTRAMUSCULAR; INTRAVENOUS; SUBCUTANEOUS at 00:25

## 2018-06-13 RX ADMIN — NALBUPHINE HYDROCHLORIDE 2.5 MG: 10 INJECTION INTRAMUSCULAR; INTRAVENOUS; SUBCUTANEOUS at 06:55

## 2018-06-13 RX ADMIN — OXYTOCIN-SODIUM CHLORIDE 0.9% IV SOLN 30 UNIT/500ML 340 ML/HR: 30-0.9/5 SOLUTION at 11:44

## 2018-06-13 RX ADMIN — NALBUPHINE HYDROCHLORIDE 2.5 MG: 10 INJECTION INTRAMUSCULAR; INTRAVENOUS; SUBCUTANEOUS at 00:25

## 2018-06-13 RX ADMIN — SERTRALINE HYDROCHLORIDE 25 MG: 25 TABLET ORAL at 20:05

## 2018-06-13 RX ADMIN — SODIUM CHLORIDE, POTASSIUM CHLORIDE, SODIUM LACTATE AND CALCIUM CHLORIDE: 600; 310; 30; 20 INJECTION, SOLUTION INTRAVENOUS at 06:51

## 2018-06-13 RX ADMIN — SENNOSIDES AND DOCUSATE SODIUM 2 TABLET: 8.6; 5 TABLET ORAL at 20:05

## 2018-06-13 RX ADMIN — FENTANYL CITRATE 100 MCG: 50 INJECTION, SOLUTION INTRAMUSCULAR; INTRAVENOUS at 12:30

## 2018-06-13 RX ADMIN — ACETAMINOPHEN 650 MG: 325 TABLET, FILM COATED ORAL at 12:54

## 2018-06-13 RX ADMIN — ACETAMINOPHEN 650 MG: 325 TABLET, FILM COATED ORAL at 18:40

## 2018-06-13 RX ADMIN — OXYCODONE HYDROCHLORIDE 5 MG: 5 TABLET ORAL at 20:45

## 2018-06-13 RX ADMIN — NALBUPHINE HYDROCHLORIDE 2.5 MG: 10 INJECTION, SOLUTION INTRAMUSCULAR; INTRAVENOUS; SUBCUTANEOUS at 06:55

## 2018-06-13 RX ADMIN — IBUPROFEN 800 MG: 400 TABLET ORAL at 12:54

## 2018-06-13 RX ADMIN — LIDOCAINE HYDROCHLORIDE 40 ML: 10 INJECTION, SOLUTION INFILTRATION; PERINEURAL at 12:32

## 2018-06-13 RX ADMIN — PANTOPRAZOLE SODIUM 40 MG: 40 TABLET, DELAYED RELEASE ORAL at 18:40

## 2018-06-13 NOTE — PLAN OF CARE
1930 JOSE M Brand at bedside for SVE. JOSE M plans to recheck patient's cervix in 2 hours and rediscuss pain management options. Currently patient using nitrous oxide on the birthing ball and states that is managing her pain.  2130 CNM at bedside for SVE. Patient requesting epidural at this time. Fluid bolus started.   2230 MDA at bedside for epidural placement. Patient tolerated procedure well and placed in left tilt position. FHT's not tracing while patient sitting for epidural from 4693-2825.  Patient able to rest some throughout the night.  0644 OMAR Brand CNM updated of SVE, FHT's, contraction pattern  0715 report given to Cayla VÁSQUEZ RN and Vikki DAVISON RN to assume cares.

## 2018-06-13 NOTE — PROGRESS NOTES
"OB Progress Note  2018  9:47 PM    S:  Pt vocalizing with ctxs. Getting tired. Reports that they are closer together.  No other complaints.      O:  /72  Pulse 80  Temp 97.9  F (36.6  C) (Oral)  Resp 20  Ht 1.702 m (5' 7\")  Wt 77.6 kg (171 lb)  LMP 2017  BMI 26.78 kg/m2  FHTs - normal per RN report.   Ctxs: q2-3min  SVE:  3.5/90%/-1, can stretch to 4, still slightly posterior  Membranes: no fluid noted on this exam.       A/P:  27 year old  @40w4d admitted for labor and SROM  1.  Routine cares  2.  Pt would like epidural. Starting fluid bolus. Once pt is comfortable will recheck cervix at midnight. If no change will start Pitocin.       Taylor Brand    "

## 2018-06-13 NOTE — PROGRESS NOTES
"OB Progress Note  2018  7:50 PM    S:  Pt coping well with ctxs control. Using NO and hot packs.  No other complaints.      O:  /79  Pulse 80  Temp 97.8  F (36.6  C) (Oral)  Resp 18  Ht 1.702 m (5' 7\")  Wt 77.6 kg (171 lb)  LMP 2017  BMI 26.78 kg/m2  FHTS: 140s per RN report.   [place]:  Ctx q2-5min  SVE:  3/90%/-1  Membranes: clear fluid.     EFW 7#12oz    A/P:  27 year old  @40w4d admitted Early Labor and SROM   1.  Routine cares  2.  Making normal progress. Discussed POC. Pt is going ok with continuing NO and trying to wait for active labor before epidural. Will recheck cervix at 21:30.      Talyor Brand    "

## 2018-06-13 NOTE — L&D DELIVERY NOTE
Delivery Summary    Vivien is a 27 year old, now  at 40w5d gestation who presented to L&D for spontaneous onset of contractions and SROM.    Pregnancy Hx: Pregnancy was complicated by:  Anxiety/depression, currently taking sertraline  Labs: Rh +, GBS neg, Rubella immune    Stage 1   Vivien presented to L&D on 18 afternoon for spontaneous onset of contractions at 04:00am and rupture of membranes at 07:30am. At the time of admission, her cervical exam was 1cm/70%/-2, leaking clear fluid with +amnisure. She was expectantly managed and progressed to 3.5cm. At this time, she requested epidural for pain control and pitocin was started for augmentation of labor at approximately  12:00am. FHR was Category 1 throughout first stage of labor with moderate variability and accelerations.  Exam revealed complete dilation at 10:27am. At this time, patient did have urge to push.    Stage 2   Patient began pushing at 10:40am on 18. She pushed with excellent maternal effort. FHR remained Category 2 during second stage with variable decelerations that quickly returned to baseline after each contraction. She achieved a normal spontaneous vaginal delivery on 18 at 11:36am of a viable female infant,  Eden , weight 8#12oz, OA to OSITO. Shoulders and body delivered without difficulty. Nuchal cord not noted. Infant with spontaneous cry and placed on maternal abdomen. Apgars 9/9 at one and five minutes. Cord clamped and cut after pulsations ceased.     Stage 3   Spontaneous delivery of complete and intact placenta with 3 vessel cord at 11:48. Cord blood obtained.  IV Pitocin administered after delivery of infant. Inspection showed right, left, and midline vaginal lacerations with extension to a second degree perineal laceration. Rectal exam revealed intact anal sphincter. Dr. Bell called to assist with repair. She repaired lacerations with 3-0 vicryl in the usual fashion under using lidocaine analgesia and  fentanyl. Laceration was well-approximated and hemostatic following repair. Total QBL 838mL with majority of blood loss occurring prior to placenta delivery. Fundus firm. Needle and sponge counts correct.  Mother and infant left stable, skin to skin, establishing breastfeeding and bonding.    Daisy Sheridan CNM  6/13/2018  1:10 PM

## 2018-06-13 NOTE — ANESTHESIA POSTPROCEDURE EVALUATION
Patient: Vivien Quinn    * No procedures listed *    Diagnosis:* No pre-op diagnosis entered *  Diagnosis Additional Information: No value filed.    Anesthesia Type:  No value filed.    Note:  Anesthesia Post Evaluation    Patient location during evaluation: Floor  Patient participation: Able to fully participate in evaluation  Level of consciousness: awake  Pain management: adequate  Airway patency: patent  Cardiovascular status: acceptable  Respiratory status: acceptable  Hydration status: acceptable  PONV: none     Anesthetic complications: None    Comments: Reportedly uncomplicated ALEX        Last vitals:  Vitals:    06/13/18 1330 06/13/18 1340 06/13/18 1355   BP: 119/57 115/58 109/65   Pulse:      Resp:      Temp:      SpO2: 99% 99% 97%         Electronically Signed By: Thalia Zafar MD  June 13, 2018  2:14 PM

## 2018-06-13 NOTE — ANESTHESIA PROCEDURE NOTES
Peripheral nerve/Neuraxial procedure note : epidural catheter  Pre-Procedure  Performed by RASHEED MALDONADO  Location: OB      Pre-Anesthestic Checklist: patient identified, IV checked, risks and benefits discussed, informed consent, monitors and equipment checked, pre-op evaluation and at physician/surgeon's request    Timeout  Correct Patient: Yes   Correct Procedure: Yes   Correct Site: Yes   Correct Laterality: N/A   Correct Position: Yes   Site Marked: N/A   .   Procedure Documentation    Diagnosis:Labor Pain.    Procedure:    Epidural catheter.  Insertion Site:L3-4  (midline approach) Injection technique: LORT saline   Local skin infiltrated with mL of 1% lidocaine.  ELLY at 5 cm     Patient Prep;mask, sterile gloves, povidone-iodine 7.5% surgical scrub, patient draped.  .  Needle: Touhy needle Needle Gauge: 17.    Needle Length (Inches) 5  # of attempts: 1 and # of redirects:  .   Catheter: 19 G . .  Catheter threaded easily  5 cm epidural space.  .   .    Assessment/Narrative  Paresthesias: No.  .  .  Aspiration negative for heme or CSF  . Test dose of 3 mL lidocaine 1.5% w/ 1:200,000 epinephrine at. Test dose negative for signs of intravascular, subdural or intrathecal injection. Comments:  Catheter secured with adhesive spray, tegaderm, and tape.

## 2018-06-13 NOTE — PLAN OF CARE
Data: Vivien Quinn transferred to [id number] via wheelchair at 1430. Baby transferred via parent's arms.  Action: Receiving unit notified of transfer: Yes. Patient and family notified of room change. Report given to Mena PULIDO at 1430. Belongings sent to receiving unit. Accompanied by Registered Nurse. Oriented patient to surroundings. Call light within reach. ID bands double-checked with receiving RN.  Response: Patient tolerated transfer and is stable.

## 2018-06-14 LAB — HGB BLD-MCNC: 9.2 G/DL (ref 11.7–15.7)

## 2018-06-14 PROCEDURE — 36415 COLL VENOUS BLD VENIPUNCTURE: CPT | Performed by: ADVANCED PRACTICE MIDWIFE

## 2018-06-14 PROCEDURE — 25000132 ZZH RX MED GY IP 250 OP 250 PS 637: Performed by: ADVANCED PRACTICE MIDWIFE

## 2018-06-14 PROCEDURE — 25000132 ZZH RX MED GY IP 250 OP 250 PS 637: Performed by: REGISTERED NURSE

## 2018-06-14 PROCEDURE — 12000037 ZZH R&B POSTPARTUM INTERMEDIATE

## 2018-06-14 PROCEDURE — 85018 HEMOGLOBIN: CPT | Performed by: ADVANCED PRACTICE MIDWIFE

## 2018-06-14 RX ORDER — CALCIUM CARBONATE 500 MG/1
1000 TABLET, CHEWABLE ORAL
Status: DISCONTINUED | OUTPATIENT
Start: 2018-06-14 | End: 2018-06-15 | Stop reason: HOSPADM

## 2018-06-14 RX ADMIN — PANTOPRAZOLE SODIUM 40 MG: 40 TABLET, DELAYED RELEASE ORAL at 16:29

## 2018-06-14 RX ADMIN — OXYCODONE HYDROCHLORIDE 10 MG: 5 TABLET ORAL at 08:20

## 2018-06-14 RX ADMIN — OXYCODONE HYDROCHLORIDE 10 MG: 5 TABLET ORAL at 14:39

## 2018-06-14 RX ADMIN — OXYCODONE HYDROCHLORIDE 5 MG: 5 TABLET ORAL at 22:35

## 2018-06-14 RX ADMIN — ACETAMINOPHEN 650 MG: 325 TABLET, FILM COATED ORAL at 22:35

## 2018-06-14 RX ADMIN — SENNOSIDES AND DOCUSATE SODIUM 2 TABLET: 8.6; 5 TABLET ORAL at 20:10

## 2018-06-14 RX ADMIN — IBUPROFEN 800 MG: 400 TABLET ORAL at 21:03

## 2018-06-14 RX ADMIN — CALCIUM CARBONATE (ANTACID) CHEW TAB 500 MG 1000 MG: 500 CHEW TAB at 21:03

## 2018-06-14 RX ADMIN — IBUPROFEN 800 MG: 400 TABLET ORAL at 01:43

## 2018-06-14 RX ADMIN — ACETAMINOPHEN 650 MG: 325 TABLET, FILM COATED ORAL at 18:38

## 2018-06-14 RX ADMIN — IBUPROFEN 800 MG: 400 TABLET ORAL at 08:20

## 2018-06-14 RX ADMIN — SERTRALINE HYDROCHLORIDE 25 MG: 25 TABLET ORAL at 20:10

## 2018-06-14 RX ADMIN — OXYCODONE HYDROCHLORIDE 10 MG: 5 TABLET ORAL at 11:34

## 2018-06-14 RX ADMIN — OXYCODONE HYDROCHLORIDE 5 MG: 5 TABLET ORAL at 18:38

## 2018-06-14 RX ADMIN — SENNOSIDES AND DOCUSATE SODIUM 1 TABLET: 8.6; 5 TABLET ORAL at 08:20

## 2018-06-14 RX ADMIN — IBUPROFEN 800 MG: 400 TABLET ORAL at 14:39

## 2018-06-14 RX ADMIN — PANTOPRAZOLE SODIUM 40 MG: 40 TABLET, DELAYED RELEASE ORAL at 08:21

## 2018-06-14 RX ADMIN — OXYCODONE HYDROCHLORIDE 10 MG: 5 TABLET ORAL at 02:15

## 2018-06-14 RX ADMIN — ACETAMINOPHEN 650 MG: 325 TABLET, FILM COATED ORAL at 08:21

## 2018-06-14 RX ADMIN — ACETAMINOPHEN 650 MG: 325 TABLET, FILM COATED ORAL at 01:43

## 2018-06-14 RX ADMIN — ACETAMINOPHEN 650 MG: 325 TABLET, FILM COATED ORAL at 14:39

## 2018-06-14 NOTE — LACTATION NOTE
Initial Lactation visit. Requesting to see lactation services d/t sleepy infant with mostly attempts at breast and c/o of shallow latch. Assisted with feeding at time of visit; placed skin to skin and infant rooting around. Brought to right breast in cross cradle hold, nipple to nose alignment, breast compressed and latched on deeply. Coordinated suckle and swallowing. Hand expression demonstrated and drops leaking from breast. States she has been leaking for past few months.  Recommend unlimited, frequent breast feedings: At least 8 - 12 times every 24 hours. Avoid pacifiers and supplementation with formula unless medically indicated. Explained benefits of holding baby skin on skin to help promote better breastfeeding outcomes. Will revisit as needed.    Neelima Shields RN, IBCLC

## 2018-06-14 NOTE — PLAN OF CARE
Problem: Patient Care Overview  Goal: Plan of Care/Patient Progress Review  Outcome: Improving  Vital signs stable. Voiding without difficulty. Safely ambulating independently. Pain managed with Tylenol, ibuprofen and PRN oxycodone. Questions and concerns addressed. Will continue to monitor.

## 2018-06-14 NOTE — PROGRESS NOTES
"OB Post-partum Note  PPD# 1    S:  Patient doing well.  Pain controlled.  Voiding.  Bleeding light.  Breast feeding fair, baby having some difficulty with latch. Using ice and tucks on stitches with good results    O:  /78  Pulse 79  Temp 98  F (36.7  C) (Oral)  Resp 16  Ht 1.702 m (5' 7\")  Wt 77.6 kg (171 lb)  LMP 2017  SpO2 97%  Breastfeeding  BMI 26.78 kg/m2  Gen- A&O, NAD  Abd- Non-tender, fundus firm at umbilicus  Ext- non-tender, trace  edema    Hemoglobin   Date Value Ref Range Status   2018 11.2 (A) 11.7 - 15.7 g/dL Final     O+  Rubella Immune    A/P: 27 year old  PPD# 1 s/p     1.  Routine post-partum cares, lactation support  2.  Anticipate d/c home on PPD# 2.    Ashley Marie CNM  2018  8:28 AM  "

## 2018-06-14 NOTE — PLAN OF CARE
Problem: Postpartum (Vaginal Delivery) (Adult,Obstetrics,Pediatric)  Goal: Signs and Symptoms of Listed Potential Problems Will be Absent, Minimized or Managed (Postpartum)  Signs and symptoms of listed potential problems will be absent, minimized or managed by discharge/transition of care (reference Postpartum (Vaginal Delivery) (Adult,Obstetrics,Pediatric) CPG).  Outcome: No Change  Pt up ad luba. Pericare performed per pt independently. Pt able to void post removal of urinary catheter. IV saline lock no longer in place. Fundus firm with small lochia. Pt c/o perineal pain that was not controlled with tylenol and ibuprofen. CNM notified and order received for Oxycodone. Oxycodone 5mg given with no pain relief within one hour so second 5mg tablet given to pt. Pt encouraged to apply ice packs to perineum often. Pt caring for infant and self without difficulty.

## 2018-06-14 NOTE — PLAN OF CARE
Problem: Patient Care Overview  Goal: Plan of Care/Patient Progress Review  Outcome: Improving  Pt stable, VSS, PP checks WDL. Perineum very uncomfortable, taking ibuprofen, tylenol, and oxycodone.

## 2018-06-15 VITALS
TEMPERATURE: 98.3 F | BODY MASS INDEX: 26.84 KG/M2 | SYSTOLIC BLOOD PRESSURE: 116 MMHG | DIASTOLIC BLOOD PRESSURE: 72 MMHG | RESPIRATION RATE: 16 BRPM | HEIGHT: 67 IN | HEART RATE: 79 BPM | OXYGEN SATURATION: 97 % | WEIGHT: 171 LBS

## 2018-06-15 PROCEDURE — 25000132 ZZH RX MED GY IP 250 OP 250 PS 637: Performed by: REGISTERED NURSE

## 2018-06-15 PROCEDURE — 25000132 ZZH RX MED GY IP 250 OP 250 PS 637: Performed by: ADVANCED PRACTICE MIDWIFE

## 2018-06-15 RX ORDER — ACETAMINOPHEN 325 MG/1
650 TABLET ORAL EVERY 6 HOURS PRN
Qty: 100 TABLET | Status: ON HOLD | COMMUNITY
Start: 2018-06-15 | End: 2020-07-26

## 2018-06-15 RX ORDER — AMOXICILLIN 250 MG
1 CAPSULE ORAL 2 TIMES DAILY PRN
Qty: 100 TABLET | COMMUNITY
Start: 2018-06-15 | End: 2022-01-25

## 2018-06-15 RX ORDER — IBUPROFEN 600 MG/1
600 TABLET, FILM COATED ORAL EVERY 6 HOURS PRN
Status: ON HOLD | COMMUNITY
Start: 2018-06-15 | End: 2020-07-26

## 2018-06-15 RX ORDER — OXYCODONE HYDROCHLORIDE 5 MG/1
5 TABLET ORAL EVERY 4 HOURS PRN
Qty: 15 TABLET | Refills: 0 | Status: ON HOLD | OUTPATIENT
Start: 2018-06-15 | End: 2020-07-26

## 2018-06-15 RX ADMIN — ACETAMINOPHEN 650 MG: 325 TABLET, FILM COATED ORAL at 09:40

## 2018-06-15 RX ADMIN — OXYCODONE HYDROCHLORIDE 5 MG: 5 TABLET ORAL at 04:00

## 2018-06-15 RX ADMIN — OXYCODONE HYDROCHLORIDE 5 MG: 5 TABLET ORAL at 09:41

## 2018-06-15 RX ADMIN — IBUPROFEN 800 MG: 400 TABLET ORAL at 09:40

## 2018-06-15 RX ADMIN — ACETAMINOPHEN 650 MG: 325 TABLET, FILM COATED ORAL at 04:00

## 2018-06-15 RX ADMIN — IBUPROFEN 800 MG: 400 TABLET ORAL at 04:00

## 2018-06-15 RX ADMIN — SENNOSIDES AND DOCUSATE SODIUM 1 TABLET: 8.6; 5 TABLET ORAL at 09:41

## 2018-06-15 RX ADMIN — PANTOPRAZOLE SODIUM 40 MG: 40 TABLET, DELAYED RELEASE ORAL at 09:40

## 2018-06-15 NOTE — PLAN OF CARE
Problem: Patient Care Overview  Goal: Plan of Care/Patient Progress Review  Outcome: Improving  Pain is under control with tylenol, ibuprofen, and oxycodone.  Pt has been up and walked in room independently.  Pt is comfortable with baby cares.  Bonding well with baby.  is supportive and helpful at bed side. Continues to monitor Pt.

## 2018-06-15 NOTE — PLAN OF CARE
Problem: Patient Care Overview  Goal: Plan of Care/Patient Progress Review  Outcome: Adequate for Discharge Date Met: 06/15/18  Patient up ad luba and tolerating activity well.  Fundus is firm with no free flow or clots.  Taking oxycodone for 2nd degree laceration discomfort.  Denies any need for further education.  Independent with breastfeeding and baby cares.  Patient expresses understanding of discharge instructions and follow-up appt.

## 2018-06-15 NOTE — LACTATION NOTE
Getting ready for discharge.  Breastfeeding well on the right breast at time of visit.  Mother states she has some tenderness, however it is managed with nipple cream, pads and bra . Plan: Watch for feeding cues and feed every 2-3 hours and/or on demand. Continue to use feeding log to track intake and appropriate voids and stools. Take feeding log to first follow up appointment or weight check. Encourage skin to skin to promote frequent feedings, thermoregulation and bonding. Follow-up with healthcare provider or lactation consultant for questions or concerns. Has a RX for a breast pump and is working with insurance to find a vendor.  No further questions at this time.     Outpatient resource phone numbers given.   Rosa CROOKSN, RN, PHN, RNC-MNN, IBCLC

## 2018-06-15 NOTE — PROGRESS NOTES
"OB Post-partum Note  PPD# 2    S:  Patient doing well.  Pain controlled with ibuprofen, acetaminophen, and oxycodone for perineal pain.  Voiding. Flatus passed, no BM. Taking stool softener. Bleeding decreasing.  Breast feeding well.    O:  /73  Pulse 79  Temp 97.8  F (36.6  C) (Oral)  Resp 16  Ht 1.702 m (5' 7\")  Wt 77.6 kg (171 lb)  LMP 2017  SpO2 97%  Breastfeeding  BMI 26.78 kg/m2  Gen- A&O, NAD  Abd- Non-tender, fundus firm at 1 fb below umbilicus  Ext- non-tender, mjinimal edema    Hemoglobin   Date Value Ref Range Status   2018 9.2 (L) 11.7 - 15.7 g/dL Final     O+  Rubella Immune    A/P: 27 year old  PPD# 2 s/p     1.  Routine post-partum cares.  2.  Discharge home today.  3.  Reviewed warning signs with patient.  4. Continue Zoloft, reviewed mood signs to watch for.  5.  Discussed pain control, Rx for oxycodone, 15 tablets, given extensive vaginal lacerations. Discussed tapering from oxycodone and staggering ibuprofen and acetaminophen.    Daisy Sheridan  6/15/2018  8:23 AM  "

## 2019-05-06 ENCOUNTER — OFFICE VISIT - HEALTHEAST (OUTPATIENT)
Dept: FAMILY MEDICINE | Facility: CLINIC | Age: 29
End: 2019-05-06

## 2019-05-06 DIAGNOSIS — J01.90 ACUTE SINUSITIS WITH SYMPTOMS > 10 DAYS: ICD-10-CM

## 2019-05-24 ENCOUNTER — OFFICE VISIT - HEALTHEAST (OUTPATIENT)
Dept: FAMILY MEDICINE | Facility: CLINIC | Age: 29
End: 2019-05-24

## 2019-05-24 DIAGNOSIS — Z00.00 ROUTINE HEALTH MAINTENANCE: ICD-10-CM

## 2019-05-24 DIAGNOSIS — Z13.220 SCREENING FOR LIPID DISORDERS: ICD-10-CM

## 2019-05-24 DIAGNOSIS — N89.8 VAGINAL DISCHARGE: ICD-10-CM

## 2019-05-24 DIAGNOSIS — K21.9 GASTROESOPHAGEAL REFLUX DISEASE WITHOUT ESOPHAGITIS: ICD-10-CM

## 2019-05-24 DIAGNOSIS — Z13.1 SCREENING FOR DIABETES MELLITUS: ICD-10-CM

## 2019-05-24 DIAGNOSIS — F41.1 GENERALIZED ANXIETY DISORDER: ICD-10-CM

## 2019-05-24 LAB
ANION GAP SERPL CALCULATED.3IONS-SCNC: 10 MMOL/L (ref 5–18)
BUN SERPL-MCNC: 15 MG/DL (ref 8–22)
CALCIUM SERPL-MCNC: 9.5 MG/DL (ref 8.5–10.5)
CHLORIDE BLD-SCNC: 105 MMOL/L (ref 98–107)
CHOLEST SERPL-MCNC: 237 MG/DL
CLUE CELLS: NORMAL
CO2 SERPL-SCNC: 25 MMOL/L (ref 22–31)
CREAT SERPL-MCNC: 0.74 MG/DL (ref 0.6–1.1)
ERYTHROCYTE [DISTWIDTH] IN BLOOD BY AUTOMATED COUNT: 12.3 % (ref 11–14.5)
FASTING STATUS PATIENT QL REPORTED: YES
GFR SERPL CREATININE-BSD FRML MDRD: >60 ML/MIN/1.73M2
GLUCOSE BLD-MCNC: 80 MG/DL (ref 70–125)
HCT VFR BLD AUTO: 41 % (ref 35–47)
HDLC SERPL-MCNC: 74 MG/DL
HGB BLD-MCNC: 13.7 G/DL (ref 12–16)
LDLC SERPL CALC-MCNC: 151 MG/DL
MCH RBC QN AUTO: 29.5 PG (ref 27–34)
MCHC RBC AUTO-ENTMCNC: 33.3 G/DL (ref 32–36)
MCV RBC AUTO: 89 FL (ref 80–100)
PLATELET # BLD AUTO: 282 THOU/UL (ref 140–440)
PMV BLD AUTO: 7 FL (ref 7–10)
POTASSIUM BLD-SCNC: 4.2 MMOL/L (ref 3.5–5)
RBC # BLD AUTO: 4.63 MILL/UL (ref 3.8–5.4)
SODIUM SERPL-SCNC: 140 MMOL/L (ref 136–145)
TRICHOMONAS, WET PREP: NORMAL
TRIGL SERPL-MCNC: 61 MG/DL
WBC: 6.8 THOU/UL (ref 4–11)
YEAST, WET PREP: NORMAL

## 2019-05-24 ASSESSMENT — MIFFLIN-ST. JEOR: SCORE: 1425.98

## 2019-05-27 LAB
25(OH)D3 SERPL-MCNC: 32.6 NG/ML (ref 30–80)
25(OH)D3 SERPL-MCNC: 32.6 NG/ML (ref 30–80)

## 2019-06-11 ENCOUNTER — COMMUNICATION - HEALTHEAST (OUTPATIENT)
Dept: FAMILY MEDICINE | Facility: CLINIC | Age: 29
End: 2019-06-11

## 2019-06-11 DIAGNOSIS — K21.9 GASTROESOPHAGEAL REFLUX DISEASE WITHOUT ESOPHAGITIS: ICD-10-CM

## 2019-07-16 ENCOUNTER — OFFICE VISIT - HEALTHEAST (OUTPATIENT)
Dept: FAMILY MEDICINE | Facility: CLINIC | Age: 29
End: 2019-07-16

## 2019-07-16 DIAGNOSIS — Z97.3 WEARS CONTACT LENSES: ICD-10-CM

## 2019-07-16 DIAGNOSIS — H10.33 ACUTE BACTERIAL CONJUNCTIVITIS OF BOTH EYES: ICD-10-CM

## 2019-12-27 LAB
ABO + RH BLD: NORMAL
ABO + RH BLD: NORMAL
BLD GP AB SCN SERPL QL: NEGATIVE
HBV SURFACE AG SERPL QL IA: NEGATIVE
HIV 1+2 AB+HIV1 P24 AG SERPL QL IA: NEGATIVE
RUBELLA ABY IGG: NORMAL

## 2020-01-31 ENCOUNTER — AMBULATORY - HEALTHEAST (OUTPATIENT)
Dept: MATERNAL FETAL MEDICINE | Facility: HOSPITAL | Age: 30
End: 2020-01-31

## 2020-01-31 ENCOUNTER — TRANSCRIBE ORDERS (OUTPATIENT)
Dept: MATERNAL FETAL MEDICINE | Facility: CLINIC | Age: 30
End: 2020-01-31

## 2020-01-31 ENCOUNTER — MEDICAL CORRESPONDENCE (OUTPATIENT)
Dept: HEALTH INFORMATION MANAGEMENT | Facility: CLINIC | Age: 30
End: 2020-01-31

## 2020-01-31 ENCOUNTER — TRANSFERRED RECORDS (OUTPATIENT)
Dept: HEALTH INFORMATION MANAGEMENT | Facility: CLINIC | Age: 30
End: 2020-01-31

## 2020-01-31 DIAGNOSIS — O26.90 PREGNANCY RELATED CONDITION, ANTEPARTUM: Primary | ICD-10-CM

## 2020-01-31 DIAGNOSIS — O26.90 PREGNANCY, ANTEPARTUM, COMPLICATIONS: ICD-10-CM

## 2020-03-02 ENCOUNTER — HEALTH MAINTENANCE LETTER (OUTPATIENT)
Age: 30
End: 2020-03-02

## 2020-03-16 ENCOUNTER — AMBULATORY - HEALTHEAST (OUTPATIENT)
Dept: MATERNAL FETAL MEDICINE | Facility: HOSPITAL | Age: 30
End: 2020-03-16

## 2020-03-16 ENCOUNTER — VIRTUAL VISIT (OUTPATIENT)
Dept: FAMILY MEDICINE | Facility: OTHER | Age: 30
End: 2020-03-16

## 2020-03-16 NOTE — PROGRESS NOTES
"Date: 2020 08:20:36  Clinician: Daisy Reyes  Clinician NPI: [id number]  Patient: Vivien Quinn  Patient : 1990  Patient Address: [facility] [address], [place] [address]  Patient Phone: [phone]  Visit Protocol: URI  Patient Summary:  Vivien is a 29 year old ( : 1990 ) female who initiated a Visit for COVID-19 (Coronavirus) evaluation and screening. When asked the question \"Please sign me up to receive news, health information and promotions. \", Vivien responded \"No\".    Vivien states her symptoms started 1-2 days ago.   Her symptoms consist of a headache, a cough, and nasal congestion.   Symptom details     Nasal secretions: The color of her mucus is clear.    Cough: Vivien coughs every 5-10 minutes and her cough is not more bothersome at night. Phlegm does not come into her throat when she coughs. She does not believe her cough is caused by post-nasal drip.     Headache: She states the headache is mild (1-3 on a 10 point pain scale).      Vivien denies having ear pain, malaise, rhinitis, enlarged lymph nodes, facial pain or pressure, myalgias, fever, chills, wheezing, sore throat, and teeth pain. She also denies having recent facial or sinus surgery in the past 60 days and taking antibiotic medication for the symptoms. She is not experiencing dyspnea.   Precipitating events  She has recently been exposed to someone with influenza. Vivien has been in close contact with the following high risk individuals: children under the age of 5.   Pertinent COVID-19 (Coronavirus) information  Vivien has not traveled internationally or to the areas where COVID-19 (Coronavirus) is widespread in the last 14 days before the start of her symptoms.   Vivien has had close contact with a suspected or laboratory-confirmed COVID-19 patient within 14 days of symptom onset.   Vivien is not a healthcare worker and does not work in a healthcare facility.   Pertinent medical history  Vivien does not get " yeast infections when she takes antibiotics.   Vivien does not need a return to work/school note.   Weight: 155 lbs   Vivien does not smoke or use smokeless tobacco.   She is pregnant and denies breastfeeding.   Additional information as reported by the patient (free text): My 21 month old daughter is also experiencing some symptoms including mild fever and cough   Weight: 155 lbs    MEDICATIONS: sertraline oral, pantoprazole oral, Prenatal Vitamin oral, ALLERGIES: NKDA  Clinician Response:  Dear Vivien,  Based on the information provided, you have influenza (the flu). The flu is a very contagious infection that can lead to a serious illness in anyone, but some are at risk for becoming more sick than others. For this reason, it is important to know you have the flu so you can take steps to prevent spreading it to others.  Medication information  I am prescribing:     Oseltamivir (Tamiflu) 75 mg oral capsule. Take 1 capsule by mouth 2 times per day for 5 days. There are no refills with this prescription.   Unless you are allergic to the over-the-counter medication(s) below, I recommend using:     Acetaminophen (Tylenol or store brand) oral tablet. Take 1-2 tablets by mouth every 4-6 hours to help with the discomfort.   Over-the-counter medications do not require a prescription. Ask the pharmacist if you have any questions.  Self care  The following tips will keep you as comfortable as possible while you recover:     Rest    Drink plenty of water and other liquids    Take a hot shower to loosen congestion    Take a spoonful of honey to reduce your cough     If you have a fever, stay home until your temperature has returned to normal for 24 hours and you feel well enough for daily activities. And of course, wash your hands often to prevent spreading the flu and other illnesses. However, the best way to prevent the flu is to get a flu shot before each flu season.  When to seek care  Please be seen in a clinic or  urgent care if new symptoms develop, or symptoms become worse.  Additional treatment plan   Dear Vivien,  Based on the information you have provided, it does not appear you need Coronavirus (COVID-19) testing.   At this time, we recommend testing primarily for those people who have symptoms of cough and fever and have either traveled to a known area of infection or have been exposed to someone with laboratory confirmed Coronavirus by close contact.   Coronavirus - General Information:   The coronavirus infection starts within 14 days of an exposure.  Symptoms are those of a respiratory infection (such as fever, cough).   If you have not had symptoms by day 15, you should be considered uninfected by coronavirus.   Coronavirus - Symptoms:    The coronavirus can cause a respiratory illness, such as bronchitis or pneumonia.  The most common symptoms are: cough, fever, and shortness of breath.   Other symptoms are: body aches, chills, diarrhea, fatigue, headache, runny nose, and sore throat   Coronavirus - Exposure Risk Factors:   Exposure to a person who has been diagnosed with coronavirus.  Travel from an area with recent local transmission of coronavirus.  The CDC (www.cdc.gov) has the most up-to-date list of where the coronavirus outbreak is occurring.   Coronavirus - Spreading:    The virus likely spreads through respiratory droplets produced when a person coughs or sneezes. These respiratory droplets can travel approximately 6 feet and can remain on surfaces. Common disinfectants will kill the virus.  The CDC currently does not recommend healthy people wear masks.   Coronavirus - Protect Yourself:    Avoid close contact with people known to have this new coronavirus infection.  Wash hands often with soap and water or alcohol-based hand .  Avoid touching the eyes, nose or mouth.   Thank you for limiting contact with others, wearing a simple mask to cover your cough, practice good hand hygiene habits and  accessing our virtual services where possible to limit the spread of this virus.  For more information about COVID19 and options for caring for yourself at home, please visit the CDC website at https://www.cdc.gov/coronavirus/2019-ncov/about/steps-when-sick.html   For more options for care at [facility] [place] [facility], please visit our website at [email or web address]     Diagnosis: Cough  Diagnosis ICD: R05  Additional Clinician Notes: Given your recent exposure to someone with influenza, I think that's most likely what you have; because you're pregnant, I do recommend taking the Tamiflu.  Please practice as much social distancing as you are able over the next several weeks.  I hope you're feeling better soon!  Prescription: oseltamivir (Tamiflu) 75 mg oral capsule 10 capsule, 5 days supply. Take 1 capsule by mouth 2 times per day for 5 days. Refills: 0, Refill as needed: no, Allow substitutions: yes

## 2020-03-17 ENCOUNTER — RECORDS - HEALTHEAST (OUTPATIENT)
Dept: ADMINISTRATIVE | Facility: OTHER | Age: 30
End: 2020-03-17

## 2020-03-17 ENCOUNTER — NURSE TRIAGE (OUTPATIENT)
Dept: NURSING | Facility: CLINIC | Age: 30
End: 2020-03-17

## 2020-03-17 ENCOUNTER — VIRTUAL VISIT (OUTPATIENT)
Dept: FAMILY MEDICINE | Facility: OTHER | Age: 30
End: 2020-03-17

## 2020-03-17 ENCOUNTER — AMBULATORY - HEALTHEAST (OUTPATIENT)
Dept: MATERNAL FETAL MEDICINE | Facility: HOSPITAL | Age: 30
End: 2020-03-17

## 2020-03-17 ENCOUNTER — HOSPITAL ENCOUNTER (EMERGENCY)
Facility: CLINIC | Age: 30
Discharge: HOME | End: 2020-03-17
Attending: EMERGENCY MEDICINE | Admitting: EMERGENCY MEDICINE
Payer: COMMERCIAL

## 2020-03-17 VITALS — HEART RATE: 99 BPM | SYSTOLIC BLOOD PRESSURE: 116 MMHG | DIASTOLIC BLOOD PRESSURE: 76 MMHG | OXYGEN SATURATION: 96 %

## 2020-03-17 DIAGNOSIS — J06.9 UPPER RESPIRATORY TRACT INFECTION, UNSPECIFIED TYPE: ICD-10-CM

## 2020-03-17 PROCEDURE — 99282 EMERGENCY DEPT VISIT SF MDM: CPT

## 2020-03-17 ASSESSMENT — ENCOUNTER SYMPTOMS
ABDOMINAL PAIN: 0
FEVER: 0
COUGH: 1
CHILLS: 1
SHORTNESS OF BREATH: 0
SORE THROAT: 1

## 2020-03-17 NOTE — ED TRIAGE NOTES
Patient sent by OB clinic for possible Covid exposure. Patient reports cough, sob and feeling achy. Patient is 22 weeks pregnant.

## 2020-03-17 NOTE — PROGRESS NOTES
"Date: 2020 06:32:35  Clinician: Maurisio Jose  Clinician NPI: [id number]  Patient: Vivien Quinn  Patient : 1990  Patient Address: [facility] [address], [place] [address]  Patient Phone: [phone]  Visit Protocol: URI  Patient Summary:  Vivien is a 29 year old ( : 1990 ) female who initiated a Visit for COVID-19 (Coronavirus) evaluation and screening. When asked the question \"Please sign me up to receive news, health information and promotions. \", Vivien responded \"No\".    Vivien states her symptoms started 1-2 days ago.   Her symptoms consist of a sore throat, a cough, a headache, and rhinitis.   Symptom details     Nasal secretions: The color of her mucus is clear.    Cough: Vivien coughs every 5-10 minutes and her cough is not more bothersome at night. Phlegm does not come into her throat when she coughs. She does not believe her cough is caused by post-nasal drip.     Sore throat: Vivien reports having mild throat pain (1-3 on a 10 point pain scale), does not have exudate on her tonsils, and can swallow liquids. The lymph nodes in her neck are not enlarged. A rash has not appeared on the skin since the sore throat started.     Headache: She states the headache is mild (1-3 on a 10 point pain scale).      Vivien denies having wheezing, nasal congestion, teeth pain, fever, chills, ear pain, malaise, enlarged lymph nodes, facial pain or pressure, and myalgias. She also denies taking antibiotic medication for the symptoms and having recent facial or sinus surgery in the past 60 days. She is not experiencing dyspnea.   Precipitating events  Within the past week, Vivien has not been exposed to someone with strep throat. She has not recently been exposed to someone with influenza. Vivien has been in close contact with the following high risk individuals: children under the age of 5.   Pertinent COVID-19 (Coronavirus) information  Vivien has not traveled internationally or to the " areas where COVID-19 (Coronavirus) is widespread in the last 14 days before the start of her symptoms.   Vivien has had a close contact with a laboratory-confirmed COVID-19 patient within 14 days of symptom onset. She also has had a close contact with a suspected COVID-19 patient within 14 days of symptom onset.   Vivien is not a healthcare worker and does not work in a healthcare facility.   Pertinent medical history  Vivien does not get yeast infections when she takes antibiotics.   Vivien does not need a return to work/school note.   Weight: 155 lbs   Vivien does not smoke or use smokeless tobacco.   She is pregnant and denies breastfeeding.   Additional information as reported by the patient (free text): I had a meeting at work with a person w/ a confirmed case of Covid-19.  Yesterday, I submitted this form and got direction to take tamiflu.  My daughter had influenza on 2/29. we both took tamiflu as a part of her diagnosis.  I have not been exposed to anyone w/ the flu since.  I would prefer to talk with a health provider before taking more tamiflu and am concerned based on my symptoms and exposure to my coworker I could have covid-19. I have also been in contact with a suspected case at work.   Weight: 155 lbs    MEDICATIONS: Prenatal Vitamin oral, pantoprazole oral, sertraline oral, ALLERGIES: NKDA  Clinician Response:  Dear Vivien,   Based on the information you have provided, you do have symptoms that are consistent with Coronavirus (COVID-19).  The coronavirus causes mild to severe respiratory illness with the most common symptoms including fever, cough and difficulty breathing. Unfortunately, many viruses cause similar symptoms and it can be difficult to distinguish between viruses, especially in mild cases, so we are presuming that anyone with cough or fever has coronavirus at this time.  Coronavirus/COVID-19 has reached the point of community spread in [place], meaning that we are finding the  virus in people with no known exposure risk for annmarie the virus. Given the increasing commonness of coronavirus in the community we are focusing testing on those people who are in the hospital or who have severe respiratory symptoms in the Emergency Room.  For everyone else who has cough or fever, you should assume you are infected with coronavirus. Accordingly, you should self-quarantine for fourteen days from the first day your symptoms started. You should call if you find increasing shortness of breath, wheezing or sustained fever above 101.5. If you are significantly short of breath or experience chest pain you should call 911 or report to the nearest emergency department for urgent evaluation.    Isolate yourself at home.   Do Not allow any visitors  Do Not go to work or school  Do Not go to [employer],  centers, shopping, or other public places.  Do Not shake hands.  Avoid close contact with others (hugging, kissing).   Protect Others:    Cover Your Mouth and Nose with a mask, disposable tissue or wash cloth to avoid spreading germs to others.  Wash your hands and face frequently with soap and water.   If you develop significant shortness of breath that prevents you from doing normal activities, please call 911 or proceed to the nearest emergency room and alert them immediately that you have been in self-isolation for possible coronavirus.   For more information about COVID19 and options for caring for yourself at home, please visit the CDC website at https://www.cdc.gov/coronavirus/2019-ncov/about/steps-when-sick.htmlFor more options for care at [facility], please visit our website at [email or web address]     Diagnosis: Influenza due to unidentified influenza virus with other respiratory manifestations  Diagnosis ICD: J11.1  Additional Clinician Notes: I concur that you do not need to take Tamiflu at this point.    Self-isolation is the key at this point, for 2  weeks.

## 2020-03-17 NOTE — ED AVS SNAPSHOT
Emergency Department  [address] [facility] [address]  Phone:  [phone]  Fax:  [phone]                                    Vivien Quinn   MRN: [id number]    Department:   Emergency Department   Date of Visit:  3/17/2020           After Visit Summary Signature Page    I have received my discharge instructions, and my questions have been answered. I have discussed any challenges I see with this plan with the nurse or doctor.    ..........................................................................................................................................  Patient/Patient Representative Signature      ..........................................................................................................................................  Patient Representative Print Name and Relationship to Patient    ..................................................               ................................................  Date                                   Time    ..........................................................................................................................................  Reviewed by Signature/Title    ...................................................              ..............................................  Date                                               Time          22EPIC Rev 08/18

## 2020-03-17 NOTE — TELEPHONE ENCOUNTER
Sob and cough, 20 weeks preg    Provider calling and asking where to send her.  Going to [facility].    Andreea Bonilla RN  [facility] Nurse Advisor

## 2020-03-17 NOTE — DISCHARGE INSTRUCTIONS
COVID-19 testing was not performed as a test kits are currently unavailable through [facility]    Isolate Yourself:  Isolate yourself while traveling.  Do Not allow any visitors within 6 feet.  Do Not go to work or school.  Do Not go to [employer],  centers, shopping, or other public places.  Do Not shake hands.  Avoid close contact with others (hugging, kissing).    Protect Others:  Cover Your Mouth and Nose with a mask, disposable tissue or wash cloth to avoid spreading germs to others.  Wash your hands and face frequently with soap and water    Call Back If: Breathing difficulty develops or you become worse.    For more information about COVID19 and options for caring for yourself at home, please visit the CDC website at https://www.cdc.gov/coronavirus/2019-ncov/about/steps-when-sick.html  For more options for care at [facility], please visit our website at [email or web address]

## 2020-04-16 ENCOUNTER — NURSE TRIAGE (OUTPATIENT)
Dept: NURSING | Facility: CLINIC | Age: 30
End: 2020-04-16

## 2020-04-16 NOTE — TELEPHONE ENCOUNTER
Mom calling about Daughter fever since 3/15/2020 same time Mom was having symptoms fallowing exposure   Urgent care visit was recommended and tranfered to [place]  who schedulered a visit for 4:15pm today  ANAYELI Mobley  Additional Information    Negative: SEVERE difficulty breathing (e.g., struggling for each breath, speak in single words, bluish lips)    Negative: Sounds like a life-threatening emergency to the triager    Negative: Patient sounds very sick or weak to the triager    Protocols used: CORONAVIRUS (COVID-19) EXPOSURE-A- 3.30.20

## 2020-04-30 ENCOUNTER — TELEPHONE (OUTPATIENT)
Dept: MATERNAL FETAL MEDICINE | Facility: CLINIC | Age: 30
End: 2020-04-30

## 2020-04-30 NOTE — TELEPHONE ENCOUNTER
Phone call to Vivien to discuss fetal echo referral due to IVF pregnancy. Vivien was scheduled for a fetal echo on 3/18, but do to illness and covid pandemic echo was cancelled. Pt now 28 weeks asking to reschedule. Pt informed MFM would do a L2 ultrasound and refer to peds cardiology if necessary. Pt will talk to her ob provider and call back to MFM.      Karolina Chin RN

## 2020-05-20 ENCOUNTER — TRANSFERRED RECORDS (OUTPATIENT)
Dept: HEALTH INFORMATION MANAGEMENT | Facility: CLINIC | Age: 30
End: 2020-05-20

## 2020-05-21 ENCOUNTER — TRANSCRIBE ORDERS (OUTPATIENT)
Dept: MATERNAL FETAL MEDICINE | Facility: CLINIC | Age: 30
End: 2020-05-21

## 2020-05-21 DIAGNOSIS — O26.90 PREGNANCY RELATED CONDITION, ANTEPARTUM: Primary | ICD-10-CM

## 2020-05-26 ENCOUNTER — PRE VISIT (OUTPATIENT)
Dept: MATERNAL FETAL MEDICINE | Facility: CLINIC | Age: 30
End: 2020-05-26

## 2020-05-27 ENCOUNTER — HOSPITAL ENCOUNTER (INPATIENT)
Age: 30
Setting detail: OTHER
End: 2020-05-27
Payer: COMMERCIAL

## 2020-05-28 ENCOUNTER — HOSPITAL ENCOUNTER (OUTPATIENT)
Dept: ULTRASOUND IMAGING | Facility: CLINIC | Age: 30
End: 2020-05-28
Attending: OBSTETRICS & GYNECOLOGY
Payer: COMMERCIAL

## 2020-05-28 ENCOUNTER — OFFICE VISIT (OUTPATIENT)
Dept: MATERNAL FETAL MEDICINE | Facility: CLINIC | Age: 30
End: 2020-05-28
Attending: OBSTETRICS & GYNECOLOGY
Payer: COMMERCIAL

## 2020-05-28 DIAGNOSIS — O26.90 PREGNANCY RELATED CONDITION, ANTEPARTUM: ICD-10-CM

## 2020-05-28 DIAGNOSIS — O98.513: Primary | ICD-10-CM

## 2020-05-28 DIAGNOSIS — Z36.89 ENCOUNTER FOR ULTRASOUND TO ASSESS FETAL GROWTH: ICD-10-CM

## 2020-05-28 PROCEDURE — 76811 OB US DETAILED SNGL FETUS: CPT

## 2020-05-28 NOTE — PROGRESS NOTES
Please see full imaging report from View[employer] program under imaging tab.      Deanne Kerr MD  Maternal Fetal Medicine

## 2020-07-02 LAB — GROUP B STREP PCR: POSITIVE

## 2020-07-07 ENCOUNTER — COMMUNICATION - HEALTHEAST (OUTPATIENT)
Dept: FAMILY MEDICINE | Facility: CLINIC | Age: 30
End: 2020-07-07

## 2020-07-07 DIAGNOSIS — K21.9 GASTROESOPHAGEAL REFLUX DISEASE WITHOUT ESOPHAGITIS: ICD-10-CM

## 2020-07-25 ENCOUNTER — ANESTHESIA (OUTPATIENT)
Dept: OBGYN | Facility: CLINIC | Age: 30
End: 2020-07-25
Payer: COMMERCIAL

## 2020-07-25 ENCOUNTER — ANESTHESIA EVENT (OUTPATIENT)
Dept: OBGYN | Facility: CLINIC | Age: 30
End: 2020-07-25
Payer: COMMERCIAL

## 2020-07-25 ENCOUNTER — HOSPITAL ENCOUNTER (INPATIENT)
Facility: CLINIC | Age: 30
LOS: 1 days | Discharge: HOME HEALTH | End: 2020-07-26
Attending: MIDWIFE | Admitting: MIDWIFE
Payer: COMMERCIAL

## 2020-07-25 LAB
ABO + RH BLD: NORMAL
ABO + RH BLD: NORMAL
LABORATORY COMMENT REPORT: NORMAL
SARS-COV-2 RNA SPEC QL NAA+PROBE: NEGATIVE
SARS-COV-2 RNA SPEC QL NAA+PROBE: NORMAL
SPECIMEN EXP DATE BLD: NORMAL
SPECIMEN SOURCE: NORMAL
SPECIMEN SOURCE: NORMAL
T PALLIDUM AB SER QL: NONREACTIVE

## 2020-07-25 PROCEDURE — 0KQM0ZZ REPAIR PERINEUM MUSCLE, OPEN APPROACH: ICD-10-PCS | Performed by: MIDWIFE

## 2020-07-25 PROCEDURE — 36415 COLL VENOUS BLD VENIPUNCTURE: CPT | Performed by: MIDWIFE

## 2020-07-25 PROCEDURE — 12000035 ZZH R&B POSTPARTUM

## 2020-07-25 PROCEDURE — 25000128 H RX IP 250 OP 636: Performed by: ANESTHESIOLOGY

## 2020-07-25 PROCEDURE — 72200001 ZZH LABOR CARE VAGINAL DELIVERY SINGLE

## 2020-07-25 PROCEDURE — 86780 TREPONEMA PALLIDUM: CPT | Performed by: MIDWIFE

## 2020-07-25 PROCEDURE — 25000125 ZZHC RX 250: Performed by: MIDWIFE

## 2020-07-25 PROCEDURE — 25000132 ZZH RX MED GY IP 250 OP 250 PS 637: Performed by: MIDWIFE

## 2020-07-25 PROCEDURE — 86901 BLOOD TYPING SEROLOGIC RH(D): CPT | Performed by: MIDWIFE

## 2020-07-25 PROCEDURE — 25000125 ZZHC RX 250: Performed by: ANESTHESIOLOGY

## 2020-07-25 PROCEDURE — 37000011 ZZH ANESTHESIA WARD SERVICE

## 2020-07-25 PROCEDURE — 3E0R3BZ INTRODUCTION OF ANESTHETIC AGENT INTO SPINAL CANAL, PERCUTANEOUS APPROACH: ICD-10-PCS | Performed by: ANESTHESIOLOGY

## 2020-07-25 PROCEDURE — 59025 FETAL NON-STRESS TEST: CPT

## 2020-07-25 PROCEDURE — 00HU33Z INSERTION OF INFUSION DEVICE INTO SPINAL CANAL, PERCUTANEOUS APPROACH: ICD-10-PCS | Performed by: ANESTHESIOLOGY

## 2020-07-25 PROCEDURE — 25000132 ZZH RX MED GY IP 250 OP 250 PS 637: Performed by: OBSTETRICS & GYNECOLOGY

## 2020-07-25 PROCEDURE — 86900 BLOOD TYPING SEROLOGIC ABO: CPT | Performed by: MIDWIFE

## 2020-07-25 PROCEDURE — 25800030 ZZH RX IP 258 OP 636: Performed by: MIDWIFE

## 2020-07-25 PROCEDURE — G0463 HOSPITAL OUTPT CLINIC VISIT: HCPCS | Mod: 25

## 2020-07-25 PROCEDURE — 25000128 H RX IP 250 OP 636: Performed by: MIDWIFE

## 2020-07-25 PROCEDURE — U0003 INFECTIOUS AGENT DETECTION BY NUCLEIC ACID (DNA OR RNA); SEVERE ACUTE RESPIRATORY SYNDROME CORONAVIRUS 2 (SARS-COV-2) (CORONAVIRUS DISEASE [COVID-19]), AMPLIFIED PROBE TECHNIQUE, MAKING USE OF HIGH THROUGHPUT TECHNOLOGIES AS DESCRIBED BY CMS-2020-01-R: HCPCS | Performed by: MIDWIFE

## 2020-07-25 RX ORDER — NALOXONE HYDROCHLORIDE 0.4 MG/ML
.1-.4 INJECTION, SOLUTION INTRAMUSCULAR; INTRAVENOUS; SUBCUTANEOUS
Status: DISCONTINUED | OUTPATIENT
Start: 2020-07-25 | End: 2020-07-26 | Stop reason: HOSPADM

## 2020-07-25 RX ORDER — LIDOCAINE HYDROCHLORIDE AND EPINEPHRINE 15; 5 MG/ML; UG/ML
3 INJECTION, SOLUTION EPIDURAL
Status: COMPLETED | OUTPATIENT
Start: 2020-07-25 | End: 2020-07-25

## 2020-07-25 RX ORDER — ROPIVACAINE HYDROCHLORIDE 2 MG/ML
10 INJECTION, SOLUTION EPIDURAL; INFILTRATION; PERINEURAL ONCE
Status: COMPLETED | OUTPATIENT
Start: 2020-07-25 | End: 2020-07-25

## 2020-07-25 RX ORDER — PENICILLIN G POTASSIUM 5000000 [IU]/1
5 INJECTION, POWDER, FOR SOLUTION INTRAMUSCULAR; INTRAVENOUS ONCE
Status: COMPLETED | OUTPATIENT
Start: 2020-07-25 | End: 2020-07-25

## 2020-07-25 RX ORDER — BISACODYL 10 MG
10 SUPPOSITORY, RECTAL RECTAL DAILY PRN
Status: DISCONTINUED | OUTPATIENT
Start: 2020-07-27 | End: 2020-07-26 | Stop reason: HOSPADM

## 2020-07-25 RX ORDER — ONDANSETRON 2 MG/ML
4 INJECTION INTRAMUSCULAR; INTRAVENOUS EVERY 6 HOURS PRN
Status: DISCONTINUED | OUTPATIENT
Start: 2020-07-25 | End: 2020-07-25

## 2020-07-25 RX ORDER — TRANEXAMIC ACID 10 MG/ML
1 INJECTION, SOLUTION INTRAVENOUS EVERY 30 MIN PRN
Status: DISCONTINUED | OUTPATIENT
Start: 2020-07-25 | End: 2020-07-26 | Stop reason: HOSPADM

## 2020-07-25 RX ORDER — NALOXONE HYDROCHLORIDE 0.4 MG/ML
.1-.4 INJECTION, SOLUTION INTRAMUSCULAR; INTRAVENOUS; SUBCUTANEOUS
Status: DISCONTINUED | OUTPATIENT
Start: 2020-07-25 | End: 2020-07-25

## 2020-07-25 RX ORDER — METHYLERGONOVINE MALEATE 0.2 MG/ML
200 INJECTION INTRAVENOUS
Status: COMPLETED | OUTPATIENT
Start: 2020-07-25 | End: 2020-07-25

## 2020-07-25 RX ORDER — AMOXICILLIN 250 MG
1 CAPSULE ORAL 2 TIMES DAILY
Status: DISCONTINUED | OUTPATIENT
Start: 2020-07-25 | End: 2020-07-26 | Stop reason: HOSPADM

## 2020-07-25 RX ORDER — OXYTOCIN/0.9 % SODIUM CHLORIDE 30/500 ML
100-340 PLASTIC BAG, INJECTION (ML) INTRAVENOUS CONTINUOUS PRN
Status: COMPLETED | OUTPATIENT
Start: 2020-07-25 | End: 2020-07-25

## 2020-07-25 RX ORDER — NALBUPHINE HYDROCHLORIDE 10 MG/ML
2.5-5 INJECTION, SOLUTION INTRAMUSCULAR; INTRAVENOUS; SUBCUTANEOUS EVERY 6 HOURS PRN
Status: DISCONTINUED | OUTPATIENT
Start: 2020-07-25 | End: 2020-07-25

## 2020-07-25 RX ORDER — OXYCODONE HYDROCHLORIDE 5 MG/1
5 TABLET ORAL EVERY 4 HOURS PRN
Status: DISCONTINUED | OUTPATIENT
Start: 2020-07-25 | End: 2020-07-26 | Stop reason: HOSPADM

## 2020-07-25 RX ORDER — OXYTOCIN/0.9 % SODIUM CHLORIDE 30/500 ML
340 PLASTIC BAG, INJECTION (ML) INTRAVENOUS CONTINUOUS PRN
Status: DISCONTINUED | OUTPATIENT
Start: 2020-07-25 | End: 2020-07-26 | Stop reason: HOSPADM

## 2020-07-25 RX ORDER — OXYCODONE AND ACETAMINOPHEN 5; 325 MG/1; MG/1
1 TABLET ORAL
Status: DISCONTINUED | OUTPATIENT
Start: 2020-07-25 | End: 2020-07-25

## 2020-07-25 RX ORDER — MODIFIED LANOLIN
OINTMENT (GRAM) TOPICAL
Status: DISCONTINUED | OUTPATIENT
Start: 2020-07-25 | End: 2020-07-26 | Stop reason: HOSPADM

## 2020-07-25 RX ORDER — ACETAMINOPHEN 325 MG/1
650 TABLET ORAL EVERY 4 HOURS PRN
Status: DISCONTINUED | OUTPATIENT
Start: 2020-07-25 | End: 2020-07-25

## 2020-07-25 RX ORDER — OXYTOCIN 10 [USP'U]/ML
10 INJECTION, SOLUTION INTRAMUSCULAR; INTRAVENOUS
Status: DISCONTINUED | OUTPATIENT
Start: 2020-07-25 | End: 2020-07-26 | Stop reason: HOSPADM

## 2020-07-25 RX ORDER — IBUPROFEN 400 MG/1
800 TABLET, FILM COATED ORAL
Status: DISCONTINUED | OUTPATIENT
Start: 2020-07-25 | End: 2020-07-25

## 2020-07-25 RX ORDER — CARBOPROST TROMETHAMINE 250 UG/ML
250 INJECTION, SOLUTION INTRAMUSCULAR
Status: DISCONTINUED | OUTPATIENT
Start: 2020-07-25 | End: 2020-07-25

## 2020-07-25 RX ORDER — EPHEDRINE SULFATE 50 MG/ML
5 INJECTION, SOLUTION INTRAMUSCULAR; INTRAVENOUS; SUBCUTANEOUS
Status: DISCONTINUED | OUTPATIENT
Start: 2020-07-25 | End: 2020-07-25

## 2020-07-25 RX ORDER — ONDANSETRON 4 MG/1
4 TABLET, ORALLY DISINTEGRATING ORAL EVERY 6 HOURS PRN
Status: DISCONTINUED | OUTPATIENT
Start: 2020-07-25 | End: 2020-07-25

## 2020-07-25 RX ORDER — AMOXICILLIN 250 MG
2 CAPSULE ORAL 2 TIMES DAILY
Status: DISCONTINUED | OUTPATIENT
Start: 2020-07-25 | End: 2020-07-26 | Stop reason: HOSPADM

## 2020-07-25 RX ORDER — OXYTOCIN/0.9 % SODIUM CHLORIDE 30/500 ML
100 PLASTIC BAG, INJECTION (ML) INTRAVENOUS CONTINUOUS
Status: DISCONTINUED | OUTPATIENT
Start: 2020-07-25 | End: 2020-07-26 | Stop reason: HOSPADM

## 2020-07-25 RX ORDER — PANTOPRAZOLE SODIUM 40 MG/1
40 TABLET, DELAYED RELEASE ORAL
Status: DISCONTINUED | OUTPATIENT
Start: 2020-07-25 | End: 2020-07-26 | Stop reason: HOSPADM

## 2020-07-25 RX ORDER — CALCIUM CARBONATE 500 MG/1
1000 TABLET, CHEWABLE ORAL EVERY 4 HOURS PRN
Status: DISCONTINUED | OUTPATIENT
Start: 2020-07-25 | End: 2020-07-25 | Stop reason: CLARIF

## 2020-07-25 RX ORDER — HYDROCORTISONE 2.5 %
CREAM (GRAM) TOPICAL 3 TIMES DAILY PRN
Status: DISCONTINUED | OUTPATIENT
Start: 2020-07-25 | End: 2020-07-26 | Stop reason: HOSPADM

## 2020-07-25 RX ORDER — TRANEXAMIC ACID 10 MG/ML
1 INJECTION, SOLUTION INTRAVENOUS EVERY 30 MIN PRN
Status: DISCONTINUED | OUTPATIENT
Start: 2020-07-25 | End: 2020-07-25

## 2020-07-25 RX ORDER — IBUPROFEN 400 MG/1
800 TABLET, FILM COATED ORAL EVERY 6 HOURS PRN
Status: DISCONTINUED | OUTPATIENT
Start: 2020-07-25 | End: 2020-07-26 | Stop reason: HOSPADM

## 2020-07-25 RX ORDER — OXYTOCIN/0.9 % SODIUM CHLORIDE 30/500 ML
1-24 PLASTIC BAG, INJECTION (ML) INTRAVENOUS CONTINUOUS
Status: DISCONTINUED | OUTPATIENT
Start: 2020-07-25 | End: 2020-07-25

## 2020-07-25 RX ORDER — ACETAMINOPHEN 325 MG/1
650 TABLET ORAL EVERY 4 HOURS PRN
Status: DISCONTINUED | OUTPATIENT
Start: 2020-07-25 | End: 2020-07-26 | Stop reason: HOSPADM

## 2020-07-25 RX ORDER — METHYLERGONOVINE MALEATE 0.2 MG/ML
200 INJECTION INTRAVENOUS
Status: DISCONTINUED | OUTPATIENT
Start: 2020-07-25 | End: 2020-07-26 | Stop reason: HOSPADM

## 2020-07-25 RX ORDER — SODIUM CHLORIDE, SODIUM LACTATE, POTASSIUM CHLORIDE, CALCIUM CHLORIDE 600; 310; 30; 20 MG/100ML; MG/100ML; MG/100ML; MG/100ML
INJECTION, SOLUTION INTRAVENOUS CONTINUOUS
Status: DISCONTINUED | OUTPATIENT
Start: 2020-07-25 | End: 2020-07-25

## 2020-07-25 RX ORDER — LIDOCAINE 40 MG/G
CREAM TOPICAL
Status: DISCONTINUED | OUTPATIENT
Start: 2020-07-25 | End: 2020-07-25

## 2020-07-25 RX ORDER — OXYTOCIN 10 [USP'U]/ML
10 INJECTION, SOLUTION INTRAMUSCULAR; INTRAVENOUS
Status: DISCONTINUED | OUTPATIENT
Start: 2020-07-25 | End: 2020-07-25

## 2020-07-25 RX ADMIN — SERTRALINE HYDROCHLORIDE 50 MG: 50 TABLET ORAL at 20:06

## 2020-07-25 RX ADMIN — ROPIVACAINE HYDROCHLORIDE 10 ML: 2 INJECTION, SOLUTION EPIDURAL; INFILTRATION at 04:42

## 2020-07-25 RX ADMIN — SODIUM CHLORIDE 2.5 MILLION UNITS: 9 INJECTION, SOLUTION INTRAVENOUS at 07:39

## 2020-07-25 RX ADMIN — ACETAMINOPHEN 650 MG: 325 TABLET, FILM COATED ORAL at 23:03

## 2020-07-25 RX ADMIN — SODIUM CHLORIDE, POTASSIUM CHLORIDE, SODIUM LACTATE AND CALCIUM CHLORIDE: 600; 310; 30; 20 INJECTION, SOLUTION INTRAVENOUS at 04:54

## 2020-07-25 RX ADMIN — PENICILLIN G POTASSIUM 5 MILLION UNITS: 5000000 POWDER, FOR SOLUTION INTRAMUSCULAR; INTRAPLEURAL; INTRATHECAL; INTRAVENOUS at 04:03

## 2020-07-25 RX ADMIN — METHYLERGONOVINE MALEATE 200 MCG: 0.2 INJECTION INTRAMUSCULAR; INTRAVENOUS at 09:02

## 2020-07-25 RX ADMIN — Medication 12 ML/HR: at 04:36

## 2020-07-25 RX ADMIN — IBUPROFEN 800 MG: 400 TABLET ORAL at 10:16

## 2020-07-25 RX ADMIN — LIDOCAINE HYDROCHLORIDE AND EPINEPHRINE 3 ML: 15; 5 INJECTION, SOLUTION EPIDURAL at 04:42

## 2020-07-25 RX ADMIN — DOCUSATE SODIUM AND SENNOSIDES 1 TABLET: 8.6; 5 TABLET, FILM COATED ORAL at 20:07

## 2020-07-25 RX ADMIN — DOCUSATE SODIUM AND SENNOSIDES 1 TABLET: 8.6; 5 TABLET, FILM COATED ORAL at 11:06

## 2020-07-25 RX ADMIN — IBUPROFEN 800 MG: 400 TABLET ORAL at 16:02

## 2020-07-25 RX ADMIN — PANTOPRAZOLE SODIUM 40 MG: 40 TABLET, DELAYED RELEASE ORAL at 20:06

## 2020-07-25 RX ADMIN — ACETAMINOPHEN 650 MG: 325 TABLET, FILM COATED ORAL at 16:02

## 2020-07-25 RX ADMIN — IBUPROFEN 800 MG: 400 TABLET ORAL at 23:03

## 2020-07-25 RX ADMIN — Medication 340 ML/HR: at 08:55

## 2020-07-25 RX ADMIN — SODIUM CHLORIDE, POTASSIUM CHLORIDE, SODIUM LACTATE AND CALCIUM CHLORIDE 1000 ML: 600; 310; 30; 20 INJECTION, SOLUTION INTRAVENOUS at 04:03

## 2020-07-25 RX ADMIN — ACETAMINOPHEN 650 MG: 325 TABLET, FILM COATED ORAL at 10:16

## 2020-07-25 NOTE — ANESTHESIA PREPROCEDURE EVALUATION
"Anesthesia Pre-Procedure Evaluation    Patient: Vivien Quinn   MRN: [id number] : 1990          Preoperative Diagnosis: * No surgery found *        Past Medical History:   Diagnosis Date     ADD (attention deficit disorder)     Concerta     Anxiety     Celexa     Gastric polyp     per EGD and bx benign     GERD (gastroesophageal reflux disease) 2015    EGD     Migraine     none since highschool     PCOS (polycystic ovarian syndrome)      Past Surgical History:   Procedure Laterality Date     wisdom teeth                          Lab Results   Component Value Date    WBC 9.3 2011    HGB 9.2 (L) 2018    HCT 40.6 11/10/2017     11/10/2017     2013    POTASSIUM 4.8 2013    CHLORIDE 106 2013    CO2 22 2013    BUN 13 2013    BUN NOT APPLICABLE 2013    CR 0.83 2013    GLC 94 2013    SAGRARIO 9.8 2013    ALBUMIN 4.1 2015    PROTTOTAL 8.0 2015    ALT 22 2015    AST 13 2015    ALKPHOS 57 2015    BILITOTAL 0.4 2015    LIPASE 164 2015    TSH 2.540 2015       Preop Vitals  BP Readings from Last 3 Encounters:   20 130/74   20 116/76   06/15/18 116/72    Pulse Readings from Last 3 Encounters:   20 99   18 79   10/28/15 102      Resp Readings from Last 3 Encounters:   06/15/18 16   02/02/15 18    SpO2 Readings from Last 3 Encounters:   20 96%   18 97%      Temp Readings from Last 1 Encounters:   20 36.6  C (97.9  F) (Temporal)    Ht Readings from Last 1 Encounters:   18 1.702 m (5' 7\")      Wt Readings from Last 1 Encounters:   18 77.6 kg (171 lb)    Estimated body mass index is 26.78 kg/m  as calculated from the following:    Height as of 18: 1.702 m (5' 7\").    Weight as of 18: 77.6 kg (171 lb).       Anesthesia Plan      History & Physical Review  History and physical reviewed and following examination; no interval " change.    ASA Status:  2 .    NPO Status:  > 8 hours    Plan for Epidural            Postoperative Care      Consents  Anesthetic plan, risks, benefits and alternatives discussed with:  Patient and Spouse..                 Storm Sevilla MD

## 2020-07-25 NOTE — PLAN OF CARE
Data: Patient presented to [facility] at 0126.   Reason for maternal/fetal assessment per patient is Rule Out Labor    Patient is a . Prenatal record reviewed.    Gestational Age 40w3d. VSS. Fetal movement present. Patient denies cramping, backache, vaginal discharge, pelvic pressure, UTI symptoms, GI problems, bloody show, vaginal bleeding, edema, headache, visual disturbances, epigastric or URQ pain, rupture of membranes. Support persons Ricardo present.  Action: Verbal consent for EFM. Triage assessment completed. EFM applied. Uterine assessment complete. Fetal assessment: Presumed adequate fetal oxygenation documented (see flow record).   Response: Jennifer Marie CNM informed of pt status. Plan per provider is admit, pt ok to have epidural, start GBS abx. Patient verbalized agreement with plan. Patient transferred to room 215 ambulatory, oriented to room and call light. Report given to Yudi Desai RN.

## 2020-07-25 NOTE — PLAN OF CARE
Data: Vivien Quinn transferred to 414 via wheelchair at 1130. Baby transferred via parent's arms.  Action: Receiving unit notified of transfer: Yes. Patient and family notified of room change. Report given to Giselle DAVISON RN at 1140. Belongings sent to receiving unit. Accompanied by Registered Nurse. Oriented patient to surroundings. Call light within reach. ID bands double-checked with receiving RN.  Response: Patient tolerated transfer and is stable.

## 2020-07-25 NOTE — PLAN OF CARE
VSS; spontaneous labor. Cat 1 tracing. BBOW noted by CNM with last SVE. Will attempt to make it 4 hours from first penicillin dose prior to delivery due to GBS+ status. Pain relieved by epidural, POC discussed with patient and spouse for repositioning and using peanut ball. Pt agrees to alert RN with rectal pressure, call light within reach.

## 2020-07-25 NOTE — PLAN OF CARE
Pt. admitted from L&D  via stby and transferred to bed with stby assist. Pt. arrived with baby and was accompanied by spouse and arrived with personal belongings. Report was taken from Murray Danielson in L&D. VSS. Fundus is firm and midline.  Vaginal bleeding is scant.  Pitocin infusing at 100 ml's/hr.  Pt. oriented to the room and call light system. Up unable to voided at time of transfer from L& D to , fundus double checked with Erum Gao, Rn. Patient is firm at U with scant flow.

## 2020-07-25 NOTE — ANESTHESIA PROCEDURE NOTES
Procedure note : epidural catheter  Staff -   Anesthesiologist:  Storm Sevilla MD      Performed By: anesthesiologist        Pre-Procedure  Performed by Storm Sevilla MD  Location: pre-op      Pre-Anesthestic Checklist: patient identified, IV checked, site marked, risks and benefits discussed, informed consent, monitors and equipment checked, pre-op evaluation, at physician/surgeon's request and post-op pain management    Timeout  Correct Patient: Yes   Correct Procedure: Yes   Correct Site: Yes   Correct Laterality: N/A   Correct Position: Yes   Site Marked: N/A   .   Procedure Documentation    .    Procedure: epidural catheter, .   Patient Position:sitting Insertion Site:L3-4  (midline approach) Injection technique: LORT saline   Local skin infiltrated with 3 mL of 1% lidocaine.  ELLY at 5 cm    Patient Prep/Sterile Barriers; mask, sterile gloves, povidone-iodine 7.5% surgical scrub, patient draped.  .  Needle: [employer] needle   Needle Gauge: 17.    Needle Length (Inches) 3.5   # of attempts: 1 and # of redirects: : 0. .    Catheter: 19 G . .  Catheter threaded easily  3 cm epidural space.  8 cm at skin.   .    Assessment/Narrative  Paresthesias: No.  .  .  Aspiration negative for heme or CSF  . Test dose of 3 mL lidocaine 1.5% w/ 1:200,000 epinephrine at. Test dose negative for signs of intravascular, subdural or intrathecal injection. Comments:  Pt tolerated well.   Taped sterile and secure.   Ropivacaine bolus (documented separately in MAR) done >3 minutes after test dose, in increments, with intermittent negative aspirations.    FHTs stable after the procedure.   No complications.

## 2020-07-25 NOTE — L&D DELIVERY NOTE
OB Delivery Summary    1.  with  IUP @ 40w3d  2.  Pregnancy complications- Possible Covid exposure in March, Positive dx with influenza in March tx with Tamiflu, anxiety, takes sertraline, IVF pregnancy, echo normal.  Polyhydramnios dx at 35 weeks, GBS positive  3.  Labor- Spontaneous   4   Analgesia- Epidural-  5.  Fetal heart tones-160 baseline, accelerations absent, mod variability, variable  Decelerations with pushing, return to baseline after contraction  6.  Labor interventions- IV antibiotics for GBS   7.  Membranes-SROM @ 0653 on 20  8.   Infant- viable, vigorous female delivered at 0849 on 2020, Apgars 8 and 9 at one and five minutes.  Weight pending, baby skin to skin  9.   Placenta- delivered spontaneously at 0855, in tact with 3V cord   10.  Lacerations- 2nd degree laceration, vaginal laceration, 3-0 vicryl suture used to repair in the usual fashion. Dr Perez called for repair.  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  Complications- nucal cord x 1, unable to reduce, clamped and cut on perineum.  Brisk bleeding after placenta, Methergine 0.2mg IM given, bleeding improved  12.  EBL- 480cc  13.  Labor course:  1st stage 8hr                                  2nd stage 39min                                   3rd stage 6min    Ashley Marie CNM  2020  9:46 AM

## 2020-07-25 NOTE — PLAN OF CARE
After obtaining verbal consent from patient, NP swab for covid 19 test performed. Labeled with patient label and hand delivered to lab.

## 2020-07-25 NOTE — H&P
OB Brief Admit H&P    No significant change in general health status based on examination of the patient, review of Nursing Admission Database and prenatal record.    Pt is a 29 year old  @ 40w3d who presented to L&D with painful uterine contractions.  Reports good fetal movement, denies loss of fluid or vaginal bleeding. Desires epidural for pain control    Patient's prenatal course has been complicated by   1.  IVF pregnancy, normal fetal echo  2.  Anxiety:  Managed with Sertraline  3.  Possible Covid exposure in March, along with positive dx for Influenza, tx with Tamiflu  4.  Possible CMV exposure, IGG and IGM negative  5.  Polyhydramnios dx at 35 weeks, DEVYN on  23.2  6.  GBS positive  7.  EFW @ 39 weeks 9lbs 5oz  8.  Recent death of Mother due to lymphoma, has therapist      Prenatal Labs:    Blood type O positive  Rubella Immune  [id number]  GBS positive    EFW: 9.5lbs    /74   Temp 97.9  F (36.6  C) (Temporal)   LMP 10/23/2019   EFM:  130  [place]: 2-4min  SVE: 5/80%/-2  Membranes:  intact    Assessment:  29 year old  @ 40w3d admitted for labor, Cat I, GBS pos    Plan:  1. Admit to labor and delivery   2. Epidural asap  3. Anticipate NSVB    Ashley Marie CNM  2020  4:36 AM

## 2020-07-25 NOTE — PROGRESS NOTES
OB Progress Note  2020  6:29 AM    S:  Comfortable with epidural, feeling increased rectal pressure with some contractions      O:  /63   Temp 98.4  F (36.9  C)   Resp 16   LMP 10/23/2019   SpO2 100%   EFM: baseline 130, accelerations present, absent decelerations, mod variability; Category I  [place]:  Ctx q4-6 min  SVE:  8/90%/BBOW  Membranes: intact    A/P:  29 year old  @40w3d, active labor, Cat I, GBS pos  1.  Routine care  2.  Continue Penicillin per protocol  3.  Anticipate     Ashley Marie, JOSE M, CNM

## 2020-07-25 NOTE — PLAN OF CARE
Vss, fundus firm with scant flow. Pain managed with tylenol and ibuprofen. Pt voiding spontaneously. Pt does c/o slight numbness to her right upper leg but states that its improving and is able to ambulate and feels safe doing so. Pt is breast feeding  well. Spouse at bedside and supportive. Encouraged to call with questions/needs.

## 2020-07-26 VITALS
HEART RATE: 85 BPM | DIASTOLIC BLOOD PRESSURE: 81 MMHG | OXYGEN SATURATION: 97 % | RESPIRATION RATE: 16 BRPM | TEMPERATURE: 97.7 F | SYSTOLIC BLOOD PRESSURE: 121 MMHG

## 2020-07-26 LAB — HGB BLD-MCNC: 9.8 G/DL (ref 11.7–15.7)

## 2020-07-26 PROCEDURE — 36415 COLL VENOUS BLD VENIPUNCTURE: CPT | Performed by: MIDWIFE

## 2020-07-26 PROCEDURE — 85018 HEMOGLOBIN: CPT | Performed by: MIDWIFE

## 2020-07-26 PROCEDURE — 25000132 ZZH RX MED GY IP 250 OP 250 PS 637: Performed by: MIDWIFE

## 2020-07-26 RX ORDER — IBUPROFEN 200 MG
800 TABLET ORAL EVERY 6 HOURS PRN
COMMUNITY
Start: 2020-07-26 | End: 2022-01-25

## 2020-07-26 RX ORDER — OXYCODONE HYDROCHLORIDE 5 MG/1
5 TABLET ORAL EVERY 4 HOURS PRN
Qty: 20 TABLET | Refills: 0 | Status: SHIPPED | OUTPATIENT
Start: 2020-07-26 | End: 2022-01-25

## 2020-07-26 RX ORDER — ACETAMINOPHEN 325 MG/1
650 TABLET ORAL EVERY 4 HOURS PRN
COMMUNITY
Start: 2020-07-26 | End: 2022-01-25

## 2020-07-26 RX ADMIN — IBUPROFEN 800 MG: 400 TABLET ORAL at 05:05

## 2020-07-26 RX ADMIN — ACETAMINOPHEN 650 MG: 325 TABLET, FILM COATED ORAL at 05:05

## 2020-07-26 RX ADMIN — IBUPROFEN 800 MG: 400 TABLET ORAL at 10:44

## 2020-07-26 RX ADMIN — ACETAMINOPHEN 650 MG: 325 TABLET, FILM COATED ORAL at 10:44

## 2020-07-26 RX ADMIN — DOCUSATE SODIUM AND SENNOSIDES 1 TABLET: 8.6; 5 TABLET, FILM COATED ORAL at 07:44

## 2020-07-26 RX ADMIN — OXYCODONE HYDROCHLORIDE 5 MG: 5 TABLET ORAL at 07:44

## 2020-07-26 NOTE — PLAN OF CARE
D: VSS, assessments WDL.   I: Pt. received complete discharge paperwork and home medications as filled by discharge pharmacy to include oxycodone.  Pt. was given times of last dose for all discharge medications in writing on discharge medication sheets.  Discharge teaching included home medication, pain management, activity restrictions, postpartum cares, and signs and symptoms of infection.    A: Discharge outcomes on care plan met.  Mother states understanding and comfort with self and baby cares.  P: Pt. discharged to home.  Pt. was discharged with baby, and bands were checked at time of discharge.  Pt. was accompanied by , nurse and baby, and left with personal belongings.  Home care ordered. Pt. to follow up with OB per MD order.  Pt. had no further questions at the time of discharge and no unmet needs were identified.

## 2020-07-26 NOTE — PROGRESS NOTES
[facility]   Post-Partum Progress Note        Interval History:   Doing well.  Pain is adequately controlled.  No fevers.  No history of foul-smelling vaginal discharge.  Good appetite.  Denies chest pain, shortness of breath, nausea or vomiting.  Vaginal bleeding is similar to a heavy menstrual flow.  Breastfeeding well. Pt received 2 doses of antibiotics for GBS prophylaxis prior to delivery.            Medications:   All medications related to the patient's surgery have been reviewed          Physical Exam:   All vitals stable  Blood pressure 121/81, pulse 85, temperature 97.7  F (36.5  C), temperature source Oral, resp. rate 16, last menstrual period 10/23/2019, SpO2 97 %, unknown if currently breastfeeding.  Pt breastfeeding during assessment so exam deferred.           Data:     Hemoglobin   Date Value Ref Range Status   07/26/2020 9.8 (L) 11.7 - 15.7 g/dL Final   06/14/2018 9.2 (L) 11.7 - 15.7 g/dL Final   03/16/2018 11.2 (A) 11.7 - 15.7 g/dL Final   11/10/2017 13.9 11.7 - 15.7 g/dL Final   08/16/2013 15.0 12 - 16 GM/DL Final            Assessment and Plan:    Assessment:   Post-partum day #1  Normal spontaneous vaginal delivery  :Mildly anemic. Start OTC iron.      Doing well.   Plan:   Discharge later today     Taylor Brand CNM

## 2020-07-26 NOTE — LACTATION NOTE
Routine visit. Vivien is discharging home with her baby and  today. She states breastfeeding is going well so far and infant cluster fed overnight. She denies questions or concerns. Recommended she continue using infant feeding log to track infant feedings, voids and stools and use outpatient lactation resources as needed. Vivien and her  appreciative of my visit.

## 2020-07-26 NOTE — PLAN OF CARE
VSS. Voiding adequately, up independently. 2nd deg lac, minimal bleeding- to use peribottle only, for pericare. Fundus firm, bleeding WDL. Pain managed with tylenol, ibuprofen, ice packs and tucks. C/o some remaining upper R leg tingling/discomfort. Breastfeeding well. Interested in discharge today.

## 2020-07-26 NOTE — LACTATION NOTE
Initial Lactation visit with Ricardo Puga, and baby girl. Vivien reports feeding is going well so far. Reports successful breast feeding with previous child. Recommend unlimited, frequent breast feedings: At least 8 - 12 times every 24 hours. Recommended rooming in. Instructed in hand expression. Avoid pacifiers and supplementation with formula unless medically indicated. Explained benefits of holding baby skin on skin to help promote better breastfeeding outcomes. Will revisit as needed.    Naomie Jama RN  Lactation Educator

## 2020-07-26 NOTE — ANESTHESIA POSTPROCEDURE EVALUATION
Patient: Vivien Quinn    * No procedures listed *    Diagnosis:* No pre-op diagnosis entered *  Diagnosis Additional Information: No value filed.    Anesthesia Type:  No value filed.    Note:  Anesthesia Post Evaluation    Patient location during evaluation: Floor (Postpartum Unit)  Patient participation: Able to fully participate in evaluation  Level of consciousness: awake and alert  Pain management: adequate  Airway patency: patent  Cardiovascular status: acceptable and hemodynamically stable  Respiratory status: acceptable, spontaneous ventilation and unassisted  Hydration status: acceptable  PONV: none       Comments: Pt denies epidural-related complaints.         Last vitals:  Vitals:    07/25/20 2300 07/26/20 0550 07/26/20 0742   BP: 112/73  121/81   Pulse: 82  85   Resp: 16 16 16   Temp: 36.4  C (97.6  F)  36.5  C (97.7  F)   SpO2:            Electronically Signed By: Sridhar Zapata MD  July 26, 2020  12:42 PM

## 2020-08-10 ENCOUNTER — OFFICE VISIT - HEALTHEAST (OUTPATIENT)
Dept: FAMILY MEDICINE | Facility: CLINIC | Age: 30
End: 2020-08-10

## 2020-08-10 DIAGNOSIS — K21.9 GASTROESOPHAGEAL REFLUX DISEASE WITHOUT ESOPHAGITIS: ICD-10-CM

## 2020-09-15 ENCOUNTER — OFFICE VISIT - HEALTHEAST (OUTPATIENT)
Dept: FAMILY MEDICINE | Facility: CLINIC | Age: 30
End: 2020-09-15

## 2020-09-15 ENCOUNTER — COMMUNICATION - HEALTHEAST (OUTPATIENT)
Dept: SCHEDULING | Facility: CLINIC | Age: 30
End: 2020-09-15

## 2020-09-15 DIAGNOSIS — N61.0 MASTITIS, LEFT, ACUTE: ICD-10-CM

## 2020-10-20 ENCOUNTER — VIRTUAL VISIT (OUTPATIENT)
Dept: FAMILY MEDICINE | Facility: OTHER | Age: 30
End: 2020-10-20

## 2020-10-20 ENCOUNTER — AMBULATORY - HEALTHEAST (OUTPATIENT)
Dept: FAMILY MEDICINE | Facility: CLINIC | Age: 30
End: 2020-10-20

## 2020-10-20 DIAGNOSIS — Z20.822 SUSPECTED COVID-19 VIRUS INFECTION: ICD-10-CM

## 2020-10-20 NOTE — PROGRESS NOTES
"Date: 10/20/2020 10:09:33  Clinician: Dylan Braxton  Clinician NPI: [id number]  Patient: Vivien Quinn  Patient : 1990  Patient Address: [facility] [address], [place] [address]  Patient Phone: [phone]  Visit Protocol: URI  Patient Summary:  Vivien is a 30 year old ( : 1990 ) female who initiated a [facility] Visit for COVID-19 (Coronavirus) evaluation and screening. When asked the question \"Please sign me up to receive news, health information and promotions. \", Vivien responded \"No\".    Vivien states her symptoms started 1-2 days ago.   Her symptoms consist of a headache, nasal congestion, rhinitis, myalgia, and a sore throat.   Symptom details     Nasal secretions: The color of her mucus is clear.    Sore throat: Vivien reports having mild throat pain (1-3 on a 10 point pain scale), does not have exudate on her tonsils, and can swallow liquids. The lymph nodes in her neck are not enlarged. A rash has not appeared on the skin since the sore throat started.     Headache: She states the headache is moderate (4-6 on a 10 point pain scale).      Vivien denies having ear pain, wheezing, fever, enlarged lymph nodes, cough, anosmia, vomiting, nausea, facial pain or pressure, chills, malaise, teeth pain, ageusia, and diarrhea. She also denies taking antibiotic medication in the past month and having recent facial or sinus surgery in the past 60 days. She is not experiencing dyspnea.   Precipitating events  Within the past week, Vivien has not been exposed to someone with strep throat. She has not recently been exposed to someone with influenza. Vivien has been in close contact with the following high risk individuals: children under the age of 5.   Pertinent COVID-19 (Coronavirus) information  In the past 14 days, Vivien has not worked in a congregate living setting.   She does not work or volunteer as healthcare worker or a  and does not work or volunteer in a healthcare " facility.   Vivien also has not lived in a congregate living setting in the past 14 days. She does not live with a healthcare worker.   Vivien has not had a close contact with a laboratory-confirmed COVID-19 patient within 14 days of symptom onset.   Since December 2019, Vivien and has had upper respiratory infection (URI) or influenza-like illness. Has not been diagnosed with lab-confirmed COVID-19 test      Date(s) of previous URI or influenza-like illness (free-text): March 15-22     Symptoms Vivien experienced during previous URI or influenza-like illness as reported by the patient (free-text): Congestion, sore throat, cough, fatigue        Pertinent medical history  Vivien does not get yeast infections when she takes antibiotics.   Vivien does not need a return to work/school note.   Weight: 155 lbs   Vivien does not smoke or use smokeless tobacco.   She denies pregnancy and is breastfeeding. Her last period was over a month ago.   Weight: 155 lbs    MEDICATIONS: norethindrone (contraceptive) oral, Prenatal Vitamin oral, pantoprazole oral, sertraline oral, ALLERGIES: NKDA  Clinician Response:  Dear Vivien,   Your symptoms show that you may have coronavirus (COVID-19). This illness can cause fever, cough and trouble breathing. Many people get a mild case and get better on their own. Some people can get very sick.  What should I do?  We would like to test you for this virus.   1. Please call [phone] to schedule your visit. Explain that you were referred by [facility] to have a COVID-19 test. Be ready to share your [facility] visit ID number.  The following will serve as your written order for this COVID Test, ordered by me, for the indication of suspected COVID [Z20.828]: The test will be ordered in [facility], our electronic health record, after you are scheduled. It will show as ordered and authorized by Glen Jose MD.  Order: COVID-19 (Coronavirus) PCR for SYMPTOMATIC testing from [facility].      2. When it's  "time for your COVID test:  Stay at least 6 feet away from others. (If someone will drive you to your test, stay in the backseat, as far away from the  as you can.)   Cover your mouth and nose with a mask, tissue or washcloth.  Go straight to the testing site. Don't make any stops on the way there or back.      3.Starting now: Stay home and away from others (self-isolate) until:   You've had no fever---and no medicine that reduces fever---for one full day (24 hours). And...   Your other symptoms have gotten better. For example, your cough or breathing has improved. And...   At least 10 days have passed since your symptoms started.       During this time, don't leave the house except for testing or medical care.   Stay in your own room, even for meals. Use your own bathroom if you can.   Stay away from others in your home. No hugging, kissing or shaking hands. No visitors.  Don't go to work, school or anywhere else.    Clean \"high touch\" surfaces often (doorknobs, counters, handles, etc.). Use a household cleaning spray or wipes. You'll find a full list of  on the EPA website: www.epa.gov/pesticide-registration/list-n-disinfectants-use-against-sars-cov-2.   Cover your mouth and nose with a mask, tissue or washcloth to avoid spreading germs.  Wash your hands and face often. Use soap and water.  Caregivers in these groups are at risk for severe illness due to COVID-19:  o People 65 years and older  o People who live in a nursing home or long-term care facility  o People with chronic disease (lung, heart, cancer, diabetes, kidney, liver, immunologic)  o People who have a weakened immune system, including those who:   Are in cancer treatment  Take medicine that weakens the immune system, such as corticosteroids  Had a bone marrow or organ transplant  Have an immune deficiency  Have poorly controlled HIV or AIDS  Are obese (body mass index of 40 or higher)  Smoke regularly   o Caregivers should wear gloves " while washing dishes, handling laundry and cleaning bedrooms and bathrooms.  o Use caution when washing and drying laundry: Don't shake dirty laundry, and use the warmest water setting that you can.  o For more tips, go to www.cdc.gov/coronavirus/2019-ncov/downloads/10Things.pdf.    4.Sign up for [employer]. We know it's scary to hear that you might have COVID-19. We want to track your symptoms to make sure you're okay over the next 2 weeks. Please look for an email from [employer]---this is a free, online program that we'll use to keep in touch. To sign up, follow the link in the email. Learn more at [email or web address]  How can I take care of myself?   Get lots of rest. Drink extra fluids (unless a doctor has told you not to).   Take Tylenol (acetaminophen) for fever or pain. If you have liver or kidney problems, ask your family doctor if it's okay to take Tylenol.   Adults can take either:    650 mg (two 325 mg pills) every 4 to 6 hours, or...   1,000 mg (two 500 mg pills) every 8 hours as needed.    Note: Don't take more than 3,000 mg in one day. Acetaminophen is found in many medicines (both prescribed and over-the-counter medicines). Read all labels to be sure you don't take too much.   For children, check the Tylenol bottle for the right dose. The dose is based on the child's age or weight.    If you have other health problems (like cancer, heart failure, an organ transplant or severe kidney disease): Call your specialty clinic if you don't feel better in the next 2 days.       Know when to call 911. Emergency warning signs include:    Trouble breathing or shortness of breath Pain or pressure in the chest that doesn't go away Feeling confused like you haven't felt before, or not being able to wake up Bluish-colored lips or face.  Where can I get more information?    [facility] -- About COVID-19: [email or web address]   CDC -- What to Do If You're Sick:  www.cdc.gov/coronavirus/2019-ncov/about/steps-when-sick.html   CDC -- Ending Home Isolation: www.cdc.gov/coronavirus/2019-ncov/hcp/disposition-in-home-patients.html   [facility] -- Caring for Someone: www.cdc.gov/coronavirus/2019-ncov/if-you-are-sick/care-for-someone.html   [facility] -- Interim Guidance for Hospital Discharge to Home: www.[facility].[place]./diseases/coronavirus/hcp/hospdischarge.pdf   [facility] clinical trials (COVID-19 research studies): clinicalaffairs.[facility]-clinical-trials    Below are the COVID-19 hotlines at the [place] Department of [facility] ([facility]). Interpreters are available.    For health questions: Call 970-608-9994 or 1-590.375.6245 (7 a.m. to 7 p.m.) For questions about schools and childcare: Call 703-115-1738 or 1-359.386.5949 (7 a.m. to 7 p.m.)    Diagnosis: Contact with and (suspected) exposure to other viral communicable diseases  Diagnosis ICD: Z20.828

## 2020-10-23 ENCOUNTER — OFFICE VISIT - HEALTHEAST (OUTPATIENT)
Dept: FAMILY MEDICINE | Facility: CLINIC | Age: 30
End: 2020-10-23

## 2020-10-23 DIAGNOSIS — N61.0 MASTITIS, RIGHT, ACUTE: ICD-10-CM

## 2020-10-25 ENCOUNTER — VIRTUAL VISIT (OUTPATIENT)
Dept: FAMILY MEDICINE | Facility: OTHER | Age: 30
End: 2020-10-25

## 2020-10-25 ENCOUNTER — OFFICE VISIT - HEALTHEAST (OUTPATIENT)
Dept: FAMILY MEDICINE | Facility: CLINIC | Age: 30
End: 2020-10-25

## 2020-10-25 DIAGNOSIS — J02.9 SORE THROAT: ICD-10-CM

## 2020-10-25 DIAGNOSIS — R05.9 COUGH: ICD-10-CM

## 2020-10-25 DIAGNOSIS — R50.9 FEVER, UNSPECIFIED FEVER CAUSE: ICD-10-CM

## 2020-10-25 DIAGNOSIS — N61.0 MASTITIS: ICD-10-CM

## 2020-10-25 LAB
DEPRECATED S PYO AG THROAT QL EIA: NORMAL
FLUAV AG SPEC QL IA: NORMAL
FLUBV AG SPEC QL IA: NORMAL

## 2020-10-25 NOTE — PROGRESS NOTES
"Date: 10/25/2020 06:59:07  Clinician: Elana Sharma  Clinician NPI: [id number]  Patient: Vivien Quinn  Patient : 1990  Patient Address: [address], [place] [address]  Patient Phone: [phone]  Visit Protocol: URI  Patient Summary:  Vivien is a 30 year old ( : 1990 ) female who initiated a [facility] Visit for cold, sinus infection, or influenza. When asked the question \"Please sign me up to receive news, health information and promotions. \", Vivien responded \"No\".    Vivien states her symptoms started gradually 5-6 days ago.   Her symptoms consist of a headache, a cough, nasal congestion, facial pain or pressure, myalgia, chills, malaise, and a sore throat. Vivien also feels feverish.   Symptom details     Nasal secretions: The color of her mucus is yellow.    Cough: Vivien coughs a few times an hour and her cough is more bothersome at night. Phlegm comes into her throat when she coughs. She believes her cough is caused by post-nasal drip. The color of the phlegm is yellow.     Sore throat: Vivien reports having moderate throat pain (4-6 on a 10 point pain scale), does not have exudate on her tonsils, and can swallow liquids. She is not sure if the lymph nodes in her neck are enlarged. A rash has not appeared on the skin since the sore throat started.     Temperature: Her current temperature is 101.0 degrees Fahrenheit. Vviien has had a temperature over 100 degrees Fahrenheit for 5-7 days.     Facial pain or pressure: The facial pain or pressure feels worse when bending over or leaning forward.     Headache: She states the headache is severe (7-9 on a 10 point pain scale).      Vivien denies having ear pain, wheezing, anosmia, vomiting, rhinitis, nausea, teeth pain, ageusia, and diarrhea. She also denies double sickening (worsening symptoms after initial improvement), having a sinus infection within the past year, and having recent facial or sinus surgery in the past 60 days. " She is not experiencing dyspnea.   Precipitating events  Within the past week, Vivien has not been exposed to someone with strep throat. She has not recently been exposed to someone with influenza. Vivien has been in close contact with the following high risk individuals: children under the age of 5.   Pertinent COVID-19 (Coronavirus) information  In the past 14 days, Vivien has not worked in a congregate living setting.   She does not work or volunteer as healthcare worker or a  and does not work or volunteer in a healthcare facility.   Vivien also has not lived in a congregate living setting in the past 14 days. She does not live with a healthcare worker.   Vivien has not had a close contact with a laboratory-confirmed COVID-19 patient within 14 days of symptom onset.   Since December 2019, Vivien and has had upper respiratory infection (URI) or influenza-like illness. Has not been diagnosed with lab-confirmed COVID-19 test      Date(s) of previous URI or influenza-like illness (free-text): 3/15-3_22     Symptoms Vivien experienced during previous URI or influenza-like illness as reported by the patient (free-text): Cough, fatigue, sore throat        Pertinent medical history  Vivien has taken an antibiotic medication in the past month. Antibiotic details as reported by the patient (free text): Dicloxicillan for mastitis   Vivien does not get yeast infections when she takes antibiotics.   Vivien does not need a return to work/school note.   Weight: 160 lbs   Vivien does not smoke or use smokeless tobacco.   She denies pregnancy and is breastfeeding. Her last period was over a month ago.   Additional information as reported by the patient (free text): Had stuffy nose,sore throat, and aches on Tuesday.  Took rapid covid test Wednesday morning that was negative.  Started feeling feverish Wednesday evening along with breast pain.  Diagnosed with mastitis on friday and prescribed  dicloxacillin. Have been taking for 48 hours and breast pain is gone.  However, all other symptoms including fever have continued.   Weight: 160 lbs    MEDICATIONS: dicloxacillin oral, norethindrone (contraceptive) oral, Prenatal Vitamin oral, pantoprazole oral, sertraline oral, ALLERGIES: NKDA  Clinician Response:  Dear Vivien,  Based on the information provided, you have an influenza-like illness. This is an infection that has the same symptoms of the flu, but the specific virus is not known. Lab testing would be required to confirm the flu virus, but this is often not necessary because the treatment will be the same no matter what is causing your symptoms.  Your symptoms should improve gradually over the next week.  Medication information  Unless you are allergic to the over-the-counter medication(s) below, I recommend using:       Ibuprofen (Advil or store brand) 200 mg oral tablet. Take 1-3 tablets (200-600 mg) by mouth every 8 hours to help with the discomfort. Make sure to take the ibuprofen with food. Do not exceed 2400 mg in 24 hours.      Guaifenesin + dextromethorphan (Robitussin DM, Mucinex DM, or store brand).     Over-the-counter medications do not require a prescription. Ask the pharmacist if you have any questions.  Self care  Steps you can take to be as comfortable as possible:     Rest.    Drink plenty of fluids.    Take a warm shower to loosen congestion    Use a cool-mist humidifier.    Use throat lozenges.    Suck on frozen items such as popsicles.    Drink hot tea with lemon and honey.    Gargle with warm salt water (1/4 teaspoon of salt per 8 ounce glass of water).    Take a spoonful of honey to reduce your cough.     If you have a fever, stay home until your temperature has returned to normal for 24 hours and you feel well enough for daily activities. And of course, wash your hands often to prevent spreading the flu and other illnesses. However, the best way to prevent the flu is to get a  flu shot before each flu season.  When to seek care  Please be seen in a clinic or urgent care if any of the following occur:   New symptoms develop, or symptoms become worse   Call ahead before going to the clinic or urgent care.  Call 911 or go to the emergency room if you feel that your throat is closing off, you suddenly develop a rash, you are unable to swallow fluids, you are drooling, or you are having difficulty breathing.  COVID-19 (Coronavirus) General Information  Because there is currently no vaccine to prevent infection, the best way to protect yourself is to avoid being exposed to this virus. Common symptoms of COVID-19 include but are not limited to fever, cough, and shortness of breath. These symptoms appear 2-14 days after you are exposed to the virus that causes COVID-19. Click here for more information from the CDC on how to protect yourself.  If you are sick with COVID-19 or suspect you are infected with the virus that causes COVID-19, follow the steps here from the CDC to help prevent the disease from spreading to people in your home and community.  Click here for general information from the CDC on testing.  If you develop any of these emergency warning signs for COVID-19, get medical attention immediately:     Trouble breathing    Persistent pain or pressure in the chest    New confusion or inability to arouse    Bluish lips or face      Call your doctor or clinic before going in. Call 911 if you have a medical emergency and notify the  you have or think you may have COVID-19.  For more detailed and up to date information on COVID-19 (Coronavirus), please visit the CDC website.   Diagnosis: Influenza-like illness  Diagnosis ICD: J11.1

## 2020-10-26 LAB — GROUP A STREP BY PCR: NORMAL

## 2020-10-27 ENCOUNTER — OFFICE VISIT - HEALTHEAST (OUTPATIENT)
Dept: FAMILY MEDICINE | Facility: CLINIC | Age: 30
End: 2020-10-27

## 2020-10-27 ENCOUNTER — COMMUNICATION - HEALTHEAST (OUTPATIENT)
Dept: SCHEDULING | Facility: CLINIC | Age: 30
End: 2020-10-27

## 2020-10-27 DIAGNOSIS — R68.89 FLU-LIKE SYMPTOMS: ICD-10-CM

## 2020-12-20 ENCOUNTER — HEALTH MAINTENANCE LETTER (OUTPATIENT)
Age: 30
End: 2020-12-20

## 2020-12-30 ENCOUNTER — OFFICE VISIT - HEALTHEAST (OUTPATIENT)
Dept: FAMILY MEDICINE | Facility: CLINIC | Age: 30
End: 2020-12-30

## 2020-12-30 DIAGNOSIS — R07.81 RIB PAIN ON RIGHT SIDE: ICD-10-CM

## 2021-01-11 ENCOUNTER — COMMUNICATION - HEALTHEAST (OUTPATIENT)
Dept: FAMILY MEDICINE | Facility: CLINIC | Age: 31
End: 2021-01-11

## 2021-01-15 ENCOUNTER — HEALTH MAINTENANCE LETTER (OUTPATIENT)
Age: 31
End: 2021-01-15

## 2021-02-27 ENCOUNTER — COMMUNICATION - HEALTHEAST (OUTPATIENT)
Dept: FAMILY MEDICINE | Facility: CLINIC | Age: 31
End: 2021-02-27

## 2021-02-27 DIAGNOSIS — K21.9 GASTROESOPHAGEAL REFLUX DISEASE WITHOUT ESOPHAGITIS: ICD-10-CM

## 2021-03-05 ENCOUNTER — RECORDS - HEALTHEAST (OUTPATIENT)
Dept: ADMINISTRATIVE | Facility: OTHER | Age: 31
End: 2021-03-05

## 2021-03-28 ENCOUNTER — RECORDS - HEALTHEAST (OUTPATIENT)
Dept: ADMINISTRATIVE | Facility: OTHER | Age: 31
End: 2021-03-28

## 2021-04-02 ENCOUNTER — OFFICE VISIT - HEALTHEAST (OUTPATIENT)
Dept: FAMILY MEDICINE | Facility: CLINIC | Age: 31
End: 2021-04-02

## 2021-04-02 DIAGNOSIS — R07.0 THROAT PAIN: ICD-10-CM

## 2021-04-02 DIAGNOSIS — J02.9 EXUDATIVE PHARYNGITIS: ICD-10-CM

## 2021-04-02 LAB
BASOPHILS # BLD AUTO: 0 THOU/UL (ref 0–0.2)
BASOPHILS NFR BLD AUTO: 0 % (ref 0–2)
DEPRECATED S PYO AG THROAT QL EIA: NORMAL
EOSINOPHIL # BLD AUTO: 0.1 THOU/UL (ref 0–0.4)
EOSINOPHIL NFR BLD AUTO: 1 % (ref 0–6)
ERYTHROCYTE [DISTWIDTH] IN BLOOD BY AUTOMATED COUNT: 13.5 % (ref 11–14.5)
GROUP A STREP BY PCR: NORMAL
HCT VFR BLD AUTO: 40.1 % (ref 35–47)
HGB BLD-MCNC: 13.5 G/DL (ref 12–16)
IMM GRANULOCYTES # BLD: 0 THOU/UL
IMM GRANULOCYTES NFR BLD: 0 %
LYMPHOCYTES # BLD AUTO: 2.7 THOU/UL (ref 0.8–4.4)
LYMPHOCYTES NFR BLD AUTO: 24 % (ref 20–40)
MCH RBC QN AUTO: 28.9 PG (ref 27–34)
MCHC RBC AUTO-ENTMCNC: 33.7 G/DL (ref 32–36)
MCV RBC AUTO: 86 FL (ref 80–100)
MONOCYTES # BLD AUTO: 1.2 THOU/UL (ref 0–0.9)
MONOCYTES NFR BLD AUTO: 11 % (ref 2–10)
MONOCYTES NFR BLD AUTO: NEGATIVE %
NEUTROPHILS # BLD AUTO: 7.4 THOU/UL (ref 2–7.7)
NEUTROPHILS NFR BLD AUTO: 65 % (ref 50–70)
PLATELET # BLD AUTO: 276 THOU/UL (ref 140–440)
PMV BLD AUTO: 8.4 FL (ref 7–10)
RBC # BLD AUTO: 4.67 MILL/UL (ref 3.8–5.4)
WBC: 11.5 THOU/UL (ref 4–11)

## 2021-04-04 ENCOUNTER — RECORDS - HEALTHEAST (OUTPATIENT)
Dept: ADMINISTRATIVE | Facility: OTHER | Age: 31
End: 2021-04-04

## 2021-04-18 ENCOUNTER — HEALTH MAINTENANCE LETTER (OUTPATIENT)
Age: 31
End: 2021-04-18

## 2021-05-28 NOTE — PATIENT INSTRUCTIONS - HE
Fluids rest steam nasal saline  Cough drops  Azithromycin as directed  Prednisone as directed  Recheck if worse or no better

## 2021-05-28 NOTE — PROGRESS NOTES
Assessment/Plan:   Acute sinusitis with symptoms > 10 days  Prolonged URI with cough and now 48 hours face and teeth pain. Suspect secondary sinusitis. Lots of nasal swelling and possible some bronchospasm. We will treat with azithromycin and prednisone which has worked well for her before.   I discussed red flag symptoms, return precautions to clinic/ER and follow up care with patient/parent.  Expected clinical course, symptomatic cares advised. Questions answered. Patient/parent amenable with plan.  - azithromycin (ZITHROMAX Z-ARMANI) 250 MG tablet; 2 tabs (500 mg ) day #1, then 1 tab (250 mg) days #2-5, total 5 days  Dispense: 6 tablet; Refill: 0  - predniSONE (DELTASONE) 20 MG tablet; 40mg daily for 3-5 days then 20mg daily for 3-5 days.  Dispense: 15 tablet; Refill: 0    Fluids rest steam nasal saline  Cough drops  Azithromycin as directed  Prednisone as directed  Recheck if worse or no better     Subjective:      Vivien Quinn is a 28 y.o. female who presents with cough and congestion. She started with ST and cough on Easter, about 15 days ago. This has persisted. The cough and ST would fluctuate a little bit but never went away and she has had ongoing and persistent nasal congestion for 2-3 weeks. For the last 48 hours she has had increased sinus pain and pressure. Today she has pain into her teeth as well and a headache. No fever or chills, no vertigo or N/V/D. No ear pain. No wheeze or shortness of breath. No history of asthma. She has not been sleeping well due to the cough. She has had similar illness with season changes in the past and feels she responds best to azithromycin. She has been given prednisone in the past as well for sinus swelling and cough. Remaining ROS negative. Never smoker. Generally healthy, has had anxiety and ADD and GERD in the past.      No Known Allergies    Current Outpatient Medications on File Prior to Visit   Medication Sig Dispense Refill     pantoprazole (PROTONIX) 40 MG  tablet Take 40 mg by mouth daily.       prenatal no115-iron-folic acid 29 mg iron- 1 mg Chew Chew 1 tablet daily.       sertraline (ZOLOFT) 25 MG tablet Take 25 mg by mouth daily.       No current facility-administered medications on file prior to visit.      There is no problem list on file for this patient.      Objective:     /78 (Patient Site: Right Arm, Patient Position: Sitting, Cuff Size: Adult Regular)   Pulse 99   Temp 98.2  F (36.8  C) (Oral)   Resp 16   Wt 153 lb (69.4 kg)   SpO2 98%   BMI 23.96 kg/m      Physical  General Appearance: Alert, cooperative, no distress  Head: Normocephalic, without obvious abnormality, atraumatic  Eyes: Conjunctivae are normal.   Ears: Normal TMs and external ear canals, both ears  Nose:  Congestion, swollen red turbinates with sinus pain on percussion.  Throat: Throat is red posteriorly.  No exudate.  No significant lesions. No hoarseness  Neck: No adenopathy  Lungs: Clear to auscultation bilaterally, respirations unlabored but prolonged exp phase and deep breaths trigger cough. Heart: Regular rate and rhythm  Extremities: No lower extremity edema  Skin: no rashes or lesions  Psychiatric: Patient has a normal mood and affect.

## 2021-05-29 NOTE — PROGRESS NOTES
FEMALE PREVENTATIVE EXAM    Assessment and Plan:       1. Routine health maintenance  Healthy female exam  - HM2(CBC w/o Differential)  - Vitamin D, Total (25-Hydroxy)    2. Gastroesophageal reflux disease without esophagitis  Stable, chronic.  Previous EGDs.  Controlled on medication.    - pantoprazole (PROTONIX) 40 MG tablet; Take 1 tablet (40 mg total) by mouth daily.  Dispense: 90 tablet; Refill: 3    3. Generalized anxiety disorder  Well controlled on Sertraline.  Follows with psychiatry at [facility].     4. Screening for diabetes mellitus  - Basic Metabolic Panel    5. Screening for lipid disorders  - Lipid Cascade FASTING    6. Vaginal discharge  Wet prep pending.    - Wet Prep, Vaginal     Next follow up:  Return in about 1 year (around 5/24/2020) for Physical.    Immunization Review  Adult Imm Review: No immunizations due today    I discussed the following with the patient:   Adult Healthy Living: Importance of regular exercise  Healthy nutrition    I have had an Advance Directives discussion with the patient.    Subjective:   Chief Complaint: Vivien Quinn is an 28 y.o. female here for a preventative health visit.     HPI:  Patient is  with 1 daughter.  She will be 1 next month.  Patient works as an .  Mom passed away 6 weeks ago from Lymphoma.    History of PCOS.  She is primarily amenorrheic with this.  Had to use IVF to get pregnant.  Patient is still breastfeeding.  No periods yet.  Normal pap in 2017.  She has had a lot of vaginal discharge since giving birth.  It is not bothersome, but she does have to wear a panty liner every day due to the amount.  No vaginal itching or irritation.    History of anxiety since childhood.  Currently on Sertraline 25 mg once daily.  Anxiety is well controlled.  Some stress regarding her mom's recent death, by managing well.  Following at [facility] with a psychiatric NP.    History of chronic GERD and IBS with  primarily constipation.  Stooling habits have been much more regular since childbirth.  Patient has been on pantoprazole daily for many years.  She has had a couple of EGDs at [facility].  She had to augment the pantoprazole with Tums during her pregnancy.  Symptoms are very well controlled at this time, as long as she takes her medication.    Patient reports some headaches over the last couple of days.  This is abnormal for her.  She was treated for a sinus infection earlier this month.  She does feel that the congestion is resolved.  Continues to have some postnasal drip.  No history of seasonal allergies.  Denies any migraine type symptoms such as nausea/vomiting or sensitivity to light/sound.  Ibuprofen and caffeine are helpful.    Healthy Habits  Are you taking a daily aspirin? No  Do you typically exercising at least 40 min, 3-4 times per week?  NO  Do you usually eat at least 4 servings of fruit and vegetables a day, include whole grains and fiber and avoid regularly eating high fat foods? Yes  Have you had an eye exam in the past two years? Yes  Do you see a dentist twice per year? Yes  Do you have any concerns regarding sleep? No    Safety Screen  If you own firearms, are they secured in a locked gun cabinet or with trigger locks? The patient does not own any firearms  Do you feel you are safe where you are living?: Yes (5/24/2019  7:32 AM)  Do you feel you are safe in your relationship(s)?: Yes (5/24/2019  7:32 AM)      Review of Systems:  Please see above.  The rest of the review of systems are negative for all systems.     Pap History:   Yes - updated in Problem List and Health Maintenance accordingly  Cancer Screening       Status Date      PAP SMEAR Next Due 11/10/2020      Done 11/10/2017           Patient Care Team:  Margie Euceda NP as PCP - General (Family Medicine)        History     Reviewed By Date/Time Sections Reviewed    Margie Euceda NP 5/24/2019  7:46 AM Margie Amezquita  "E, NP 5/24/2019  7:40 AM Tobacco    Libby Loving MA 5/24/2019  7:32 AM Tobacco            Objective:   Vital Signs:   Visit Vitals  BP 98/64   Pulse 64   Temp 98  F (36.7  C)   Ht 5' 6.5\" (1.689 m)   Wt 151 lb 4.8 oz (68.6 kg)   Breastfeeding? Yes   BMI 24.05 kg/m           PHYSICAL EXAM  General Appearance: Alert, cooperative, no distress, appears stated age  Head: Normocephalic, without obvious abnormality, atraumatic  Eyes: PERRL, conjunctiva/corneas clear, EOM's intact  Ears: Normal TM's and external ear canals, both ears  Nose: Nares normal, septum midline  Throat: Lips, mucosa, and tongue normal; teeth and gums normal  Neck: Supple, symmetrical, trachea midline, no adenopathy;  thyroid: not enlarged, symmetric, no tenderness/mass/nodules  Back: no CVA tenderness  Lungs: Clear to auscultation bilaterally, respirations unlabored  Breasts: No breast masses, tenderness, asymmetry, or nipple discharge.  Heart: Regular rate and rhythm, S1 and S2 normal, no murmur, rub, or gallop  Abdomen: Soft, non-tender, bowel sounds active all four quadrants,  no masses, no organomegaly  Pelvic:Perineum: is normal  Vulva: normal  Vagina: normal mucosa, wet prep done  Extremities: Extremities normal, atraumatic, no cyanosis or edema  Skin: Skin color, texture, turgor normal, no rashes or lesions  Lymph nodes: Cervical, supraclavicular, and axillary nodes normal  Neurologic: Normal   Psychologic: appropriate affective, answers all of my questions appropriately. No hallucinations, delusion, or suicidal ideations.    ALBERTO 7 Total Score: 8 (5/24/2019  7:00 AM)    PHQ-9 Total Score: 3 (5/24/2019  7:00 AM)      Margie Euceda NP      "

## 2021-05-31 VITALS — BODY MASS INDEX: 25.28 KG/M2 | WEIGHT: 161.4 LBS

## 2021-06-03 VITALS — WEIGHT: 151.3 LBS | HEIGHT: 67 IN | BODY MASS INDEX: 23.75 KG/M2

## 2021-06-03 VITALS — WEIGHT: 153 LBS | BODY MASS INDEX: 23.96 KG/M2

## 2021-06-03 VITALS — WEIGHT: 150.6 LBS | BODY MASS INDEX: 23.94 KG/M2

## 2021-06-05 VITALS
RESPIRATION RATE: 18 BRPM | TEMPERATURE: 98.4 F | DIASTOLIC BLOOD PRESSURE: 60 MMHG | BODY MASS INDEX: 25.95 KG/M2 | WEIGHT: 163.2 LBS | SYSTOLIC BLOOD PRESSURE: 100 MMHG | HEART RATE: 112 BPM | OXYGEN SATURATION: 98 %

## 2021-06-05 VITALS
DIASTOLIC BLOOD PRESSURE: 83 MMHG | WEIGHT: 164 LBS | SYSTOLIC BLOOD PRESSURE: 123 MMHG | HEART RATE: 112 BPM | BODY MASS INDEX: 26.07 KG/M2 | RESPIRATION RATE: 18 BRPM | OXYGEN SATURATION: 100 %

## 2021-06-05 VITALS
SYSTOLIC BLOOD PRESSURE: 116 MMHG | WEIGHT: 161.1 LBS | OXYGEN SATURATION: 99 % | HEART RATE: 124 BPM | DIASTOLIC BLOOD PRESSURE: 64 MMHG | TEMPERATURE: 98.5 F | BODY MASS INDEX: 25.61 KG/M2

## 2021-06-05 VITALS
HEART RATE: 98 BPM | WEIGHT: 160 LBS | RESPIRATION RATE: 14 BRPM | TEMPERATURE: 100.3 F | DIASTOLIC BLOOD PRESSURE: 78 MMHG | OXYGEN SATURATION: 98 % | SYSTOLIC BLOOD PRESSURE: 110 MMHG | BODY MASS INDEX: 25.44 KG/M2

## 2021-06-05 VITALS
BODY MASS INDEX: 24.48 KG/M2 | OXYGEN SATURATION: 98 % | TEMPERATURE: 99.2 F | HEART RATE: 116 BPM | SYSTOLIC BLOOD PRESSURE: 108 MMHG | WEIGHT: 154 LBS | DIASTOLIC BLOOD PRESSURE: 62 MMHG

## 2021-06-06 NOTE — PROGRESS NOTES
Call made to patient for health screen.  Patient stated that she has been in contact with someone who tested + for COVID-19 and is experiencing symptoms, also has submitted entry onto [employer] [facility] website.  [facility] appt canceled.       Vania Bateman

## 2021-06-09 NOTE — TELEPHONE ENCOUNTER
Please call patient.    1 month refill given.    Patient will need a visit prior to any additional refills.  A virtual visit would be fine.    Margie Euceda NP

## 2021-06-09 NOTE — TELEPHONE ENCOUNTER
RN cannot approve Refill Request    RN can NOT refill this medication PCP messaged that patient is overdue for Office Visit. Last office visit: Visit date not found Last Physical: 5/24/2019 Last MTM visit: Visit date not found Last visit same specialty: Visit date not found.  Next visit within 3 mo: Visit date not found  Next physical within 3 mo: Visit date not found      [facility] Connection Triage/Med Refill 7/9/2020    Requested Prescriptions   Pending Prescriptions Disp Refills     pantoprazole (PROTONIX) 40 MG tablet [Pharmacy Med Name: PANTOPRAZOLE 40MG TABLETS] 180 tablet 3     Sig: TAKE 1 TABLET(40 MG) BY MOUTH TWICE DAILY       GI Medications Refill Protocol Failed - 7/7/2020  7:36 AM        Failed - PCP or prescribing provider visit in last 12 or next 3 months.     Last office visit with prescriber/PCP: Visit date not found OR same dept: Visit date not found OR same specialty: Visit date not found  Last physical: 5/24/2019 Last MTM visit: Visit date not found   Next visit within 3 mo: Visit date not found  Next physical within 3 mo: Visit date not found  Prescriber OR PCP: Margie Euceda NP  Last diagnosis associated with med order: 1. Gastroesophageal reflux disease without esophagitis  - pantoprazole (PROTONIX) 40 MG tablet [Pharmacy Med Name: PANTOPRAZOLE 40MG TABLETS]; TAKE 1 TABLET(40 MG) BY MOUTH TWICE DAILY  Dispense: 180 tablet; Refill: 3    If protocol passes may refill for 12 months if within 3 months of last provider visit (or a total of 15 months).

## 2021-06-10 NOTE — PROGRESS NOTES
"Vivien Quinn is a 30 y.o. female who is being evaluated via a billable video visit.      The patient has been notified of following:     \"This video visit will be conducted via a call between you and your physician/provider. We have found that certain health care needs can be provided without the need for an in-person physical exam.  This service lets us provide the care you need with a video conversation.  If a prescription is necessary we can send it directly to your pharmacy.  If lab work is needed we can place an order for that and you can then stop by our lab to have the test done at a later time.    Video visits are billed at different rates depending on your insurance coverage. Please reach out to your insurance provider with any questions.    If during the course of the call the physician/provider feels a video visit is not appropriate, you will not be charged for this service.\"    Patient has given verbal consent to a Video visit? Yes  How would you like to obtain your AVS? AVS Preference: MyChart.  If dropped by the video visit, the video invitation should be sent to: Text to cell phone: [phone]  Will anyone else be joining your video visit? No        Video Start Time: 148     Patient presents via video visit for a medication refill.  Patient is about 2 weeks postpartum with her second child.  Things are going well.  She is on Protonix 40 mg twice daily for treatment of GERD and acid reflux.  Her symptoms did get a little bit worse with pregnancy, and she also had a utilize Tums.  Now that she has delivered, she is no longer needing the Tums.  The Protonix has historically worked very well for her unless she stops taking.  Patient has seen GI in the past and has undergone numerous EGDs.     Her Sertraline dose was increased to 50 mg during her pregnancy by her psychiatrist.  She is doing well at this dose.    No other concerns today.     Additional provider notes: GENERAL: Healthy, alert and no " distress  EYES: Eyes grossly normal to inspection. No discharge or erythema, or obvious scleral/conjunctival abnormalities.  RESP: No audible wheeze, cough, or visible cyanosis.  No visible retractions or increased work of breathing.    NEURO: Cranial nerves grossly intact. Mentation and speech appropriate for age.  PSYCH: Mentation appears normal, affect normal/bright, judgement and insight intact, normal speech and appearance well-groomed    Assessment/Plan:  1. Gastroesophageal reflux disease without esophagitis  Continue Protonix twice daily for control of symptoms.  Continue diet and lifestyle modifications.  Will refer to GI if she starts having breakthrough symptoms.   - pantoprazole (PROTONIX) 40 MG tablet; TAKE 1 TABLET(40 MG) BY MOUTH TWICE DAILY  Dispense: 90 tablet; Refill: 3      Video-Visit Details    Type of service:  Video Visit    Video End Time (time video stopped): 155  Originating Location (pt. Location): Home    Distant Location (provider location):  [facility]/OB     Platform used for Video Visit: Yao Euceda NP

## 2021-06-11 NOTE — TELEPHONE ENCOUNTER
RN triage call from patient  She was instructed via [employer] today to be seen in clinic for possible breast infection while breast feeding  From   Petr Anglin MD  To   Vivien Quinn  Sent and Delivered   9/15/2020 12:45 PM    Last Read in [email or web address]   9/15/2020  1:00 PM by Vivien Quinn    This is Dr. Anglin covering for Margie, I think given the symptoms as you described this actually does warrant an in person or face-to-face visit, you will be charged for this, Dr. Anglin  Patient reports 8 weeks post partum, left breast soreness temp 99 under arm  Chills and body aches  Warm transferred to scheduling, will do WIC if no appointments  Rosalva Cortez, RN  [facility] Connection Triage Nurse  1:55 PM  9/15/2020

## 2021-06-11 NOTE — PROGRESS NOTES
Assessment/ Plan:         1. Mastitis, left, acute  dicloxacillin (DYNAPEN) 500 MG capsule     Acute mastitis present to the left breast.  Will start patient on oral antibiotic to treat acute infection.  Advised patient to follow-up if symptoms are not improving or worsening.  Advised symptomatic management in addition to the antibiotic including warm compresses and milk expression.She is in agreement with this plan.        Subjective:      Vivien Quinn is a 30 y.o. female who presents for concerns of mastitis. Symptoms started last night with pain in her left breast. She is feeling engorged, having aches, chills, and pain throughout the body and breast pain. She has done expression of breast milk and nursing. She started pumping this week and using an electric pump.   She checked her temp axillary 99.5 at home. She had mastitis in the same breast when she was breast feeding her first child. She Denies any chest pain, shortness of breath, or difficulty breathing.  She has not had any nausea, vomiting, or diarrhea.     The following portions of the patient's history were reviewed and updated as appropriate: allergies, current medications and problem list.    Patient Active Problem List   Diagnosis     Attention deficit hyperactivity disorder (ADHD), predominantly inattentive type     Esophageal reflux     Generalized anxiety disorder     Irritable bowel syndrome     PCOS (polycystic ovarian syndrome)     Mixed hyperlipidemia       Current Outpatient Medications   Medication Sig     dicloxacillin (DYNAPEN) 500 MG capsule Take 1 capsule (500 mg total) by mouth 4 (four) times a day for 10 days.     norethindrone (MICRONOR) 0.35 mg tablet TK 1 T PO QD UTD     pantoprazole (PROTONIX) 40 MG tablet TAKE 1 TABLET(40 MG) BY MOUTH TWICE DAILY     prenatal no115-iron-folic acid 29 mg iron- 1 mg Chew Chew 1 tablet daily.     sertraline (ZOLOFT) 50 MG tablet Take 50 mg by mouth daily.        Review of Systems   A 12  point comprehensive review of systems was negative except as noted.      Objective:      /62 (Patient Site: Right Arm, Patient Position: Sitting, Cuff Size: Adult Regular)   Pulse (!) 116   Temp 99.2  F (37.3  C) (Oral)   Wt 154 lb (69.9 kg)   LMP 10/23/2019   SpO2 98%   BMI 24.48 kg/m      General appearance: alert, appears stated age, cooperative, mild distress and acutely ill appearing  Head: Normocephalic, without obvious abnormality, atraumatic  Lungs: clear to auscultation bilaterally  Breasts: negative findings: normal in size and symmetry, nipples everted without rashes or discharge, positive findings: firm warm duct above nipple near areola at the 1-2 oclock position that is acutely tender.   Heart: regular rate and rhythm, S1, S2 normal, no murmur, click, rub or gallop  Neurologic: Grossly normal        Bhargavi Harvey CNP  4:49 PM

## 2021-06-12 NOTE — PROGRESS NOTES
ASSESSMENT:  1. Mastitis, right, acute    Since she has had this before and is familiar with the symptoms, and has done well on dicloxacillin in the past, we will go ahead and do that for her again 4 times a day for 10 days.  Continue with heat and other symptomatic treatments and follow-up if she is not improving after that.    - dicloxacillin (DYNAPEN) 500 MG capsule; Take 1 capsule (500 mg total) by mouth 4 (four) times a day for 10 days.  Dispense: 40 capsule; Refill: 0          PLAN:  There are no Patient Instructions on file for this visit.    No orders of the defined types were placed in this encounter.    There are no discontinued medications.    No follow-ups on file.    CHIEF COMPLAINT:  Chief Complaint   Patient presents with     Fever     upper respatory cold, tested wednesday and came back neg for covid - stuffy nose, headaches, body aches, Rt breast soreness, Wed started to feel feverish and has been taking Ibprofen since to keep fever and symptoms at bay, gets chills/sweats at night - wondering if this is mastitis as has had this before       HISTORY OF PRESENT ILLNESS:  Vivien is a 30 y.o. female presenting to the clinic today with what she believes is a recurrence of mastitis.  She has had this a couple of times in the past.  She is nursing and now has gotten this on the right side, where she has normally gotten on the left in the past.  She describes a warmth and tenderness and firmness to the lateral aspect of the right breast.  She has continued to nurse through this.  She has had no fevers or chills.    REVIEW OF SYSTEMS:     All other systems are negative.    PFSH:    Reviewed      TOBACCO USE:  Social History     Tobacco Use   Smoking Status Never Smoker   Smokeless Tobacco Never Used       VITALS:  Vitals:    10/23/20 0834   BP: 116/64   Patient Site: Left Arm   Patient Position: Sitting   Cuff Size: Adult Large   Pulse: (!) 124   Temp: 98.5  F (36.9  C)   SpO2: 99%   Weight: 161 lb 1.6 oz  (73.1 kg)     Wt Readings from Last 3 Encounters:   10/23/20 161 lb 1.6 oz (73.1 kg)   09/15/20 154 lb (69.9 kg)   07/16/19 150 lb 9.6 oz (68.3 kg)     Body mass index is 25.61 kg/m .    PHYSICAL EXAM:  Constitutional:  Well appearing patient in no acute distress.  Vitals:  Per nursing notes.      The visit lasted a total of 15 minutes face to face with the patient. Over 50% of the time was spent counseling and educating the patient about medications, medication adjustments, medication side effects, and lab testing.        MEDICATIONS:  Current Outpatient Medications   Medication Sig Dispense Refill     norethindrone (MICRONOR) 0.35 mg tablet TK 1 T PO QD UTD       pantoprazole (PROTONIX) 40 MG tablet TAKE 1 TABLET(40 MG) BY MOUTH TWICE DAILY 90 tablet 3     prenatal no115-iron-folic acid 29 mg iron- 1 mg Chew Chew 1 tablet daily.       sertraline (ZOLOFT) 50 MG tablet Take 50 mg by mouth daily.        dicloxacillin (DYNAPEN) 500 MG capsule Take 1 capsule (500 mg total) by mouth 4 (four) times a day for 10 days. 40 capsule 0     No current facility-administered medications for this visit.

## 2021-06-12 NOTE — TELEPHONE ENCOUNTER
"Pt calls re mastitis -> diagnosed at [facility] walk-in-clinic.  Currently taking Keflex -> 500 mg taken 4x daily  (Was advised to stop dicloxacillin since fever persisted.)  Breast pain \"almost totally resolved.\"    Persisting symptoms:  -> Fever -> 101.5 (orally) at 3 am today  -> \"awoke with chills\"  -> \"splitting headache\"  -> \"body aches\"  -> \"cough spells\"   \"A lot better after ibuprofen.\"    Tested Negative for Covid 10/21/2020.  (\"Had rapid covid test at [facility].\")  Also tested Negative for strep and influenza.    What to do about persisting fever w/chills, along with other flu-like symptoms (even though tested negative for Covid)?    Pt agrees to schedule virtual video visit.  Transferred to a  for an appointment now.    Janice Robertson RN  [facility] Triage     Reason for Disposition    Taking antibiotic > 48 hours (2 days) and fever still present (SAME)     No improvement in fever level whatsoever (including persisting chills before ibuprofen)    Additional Information    Negative: SEVERE difficulty breathing (e.g., struggling for each breath, speaks in single words)    Negative: Sounds like a life-threatening emergency to the triager    Negative: Sinus infection and taking an antibiotic    Negative: Wound infection and taking an antibiotic    Negative: MODERATE difficulty breathing (e.g., speaks in phrases, SOB even at rest, pulse 100-120)    Negative: Fever > 103 F (39.4 C)    Negative: Patient sounds very sick or weak to the triager    Negative: Finished taking antibiotics and symptoms are BETTER but are not completely gone    Negative: Patient wants to be seen    Negative: Taking antibiotic and new onset of fever    Protocols used: INFECTION ON ANTIBIOTIC FOLLOW-UP CALL-A-OH    ________________________    COVID 19 Nurse Triage Plan/Patient Instructions    Please be aware that novel coronavirus (COVID-19) may be circulating in the community. If you develop symptoms such as fever, cough, or SOB " "or if you have concerns about the presence of another infection including coronavirus (COVID-19), please contact your health care provider or visit [email or web address].     Disposition/Instructions    Additional COVID19 information to add for patients.   How can I protect others?  If you have symptoms (fever, cough, body aches or trouble breathing): Stay home and away from others (self-isolate) until:    At least 10 days have passed since your symptoms started, And     You ve had no fever--and no medicine that reduces fever--for 1 full day (24 hours), And      Your other symptoms have resolved (gotten better).     If you don t have symptoms, but a test showed that you have COVID-19 (you tested positive):    Stay home and away from others (self-isolate). Follow the tips under \"How do I self-isolate?\" below for 10 days (20 days if you have a weak immune system).    You don't need to be retested for COVID-19 before going back to school or work. As long as you're fever-free and feeling better, you can go back to school, work and other activities after waiting the 10 or 20 days.     How do I self-isolate?    Stay in your own room, even for meals. Use your own bathroom if you can.     Stay away from others in your home. No hugging, kissing or shaking hands. No visitors.    Don t go to work, school or anywhere else.     Clean  high touch  surfaces often (doorknobs, counters, handles, etc.). Use a household cleaning spray or wipes. You ll find a full list on the EPA website:  www.epa.gov/pesticide-registration/list-n-disinfectants-use-against-sars-cov-2.    Cover your mouth and nose with a mask, tissue or washcloth to avoid spreading germs.    Wash your hands and face often. Use soap and water.    Caregivers in these groups are at risk for severe illness due to COVID-19:  o People 65 years and older  o People who live in a nursing home or long-term care facility  o People with chronic disease (lung, heart, cancer, diabetes, " kidney, liver, immunologic)  o People who have a weakened immune system, including those who:  - Are in cancer treatment  - Take medicine that weakens the immune system, such as corticosteroids  - Had a bone marrow or organ transplant  - Have an immune deficiency  - Have poorly controlled HIV or AIDS  - Are obese (body mass index of 40 or higher)  - Smoke regularly    Caregivers should wear gloves while washing dishes, handling laundry and cleaning bedrooms and bathrooms.    Use caution when washing and drying laundry: Don t shake dirty laundry, and use the warmest water setting that you can.    For more tips, go to www.cdc.gov/coronavirus/2019-ncov/downloads/10Things.pdf.    How can I take care of myself?  1. Get lots of rest. Drink extra fluids (unless a doctor has told you not to).     2. Take Tylenol (acetaminophen) for fever or pain. If you have liver or kidney problems, ask your family doctor if it s okay to take Tylenol.     Adults can take either:     650 mg (two 325 mg pills) every 4 to 6 hours, or     1,000 mg (two 500 mg pills) every 8 hours as needed.     Note: Don t take more than 3,000 mg in one day.   Acetaminophen is found in many medicines (both prescribed and over-the-counter medicines). Read all labels to be sure you don t take too much.     For children, check the Tylenol bottle for the right dose. The dose is based on the child s age or weight.    3. If you have other health problems (like cancer, heart failure, an organ transplant or severe kidney disease): Call your specialty clinic if you don t feel better in the next 2 days.    4. Know when to call 911: Emergency warning signs include:    Trouble breathing or shortness of breath    Pain or pressure in the chest that doesn t go away    Feeling confused like you haven t felt before, or not being able to wake up    Bluish-colored lips or face    What are the symptoms of COVID-19?     The most common symptoms are cough, fever and trouble  breathing.     Less common symptoms include body aches, chills, diarrhea (loose, watery poops), fatigue (feeling very tired), headache, runny nose, sore throat and loss of smell.    COVID-19 can cause severe coughing (bronchitis) and lung infection (pneumonia).    How does it spread?     The virus may spread when a person coughs or sneezes into the air. The virus can travel about 6 feet this way, and it can live on surfaces.      Common  (household disinfectants) will kill the virus.    Who is at risk?  Anyone can catch COVID-19 if they re around someone who has the virus.    How can others protect themselves?     Stay away from people who have COVID-19 (or symptoms of COVID-19).    Wash hands often with soap and water. Or, use hand  with at least 60% alcohol.    Avoid touching the eyes, nose or mouth.     Wear a face mask when you go out in public, when sick or when caring for a sick person.    Where can I get more information?    [facility]: About COVID-19: [email or web address]    CDC: What to Do If You re Sick: www.cdc.gov/coronavirus/2019-ncov/about/steps-when-sick.html    CDC: Ending Home Isolation: www.cdc.gov/coronavirus/2019-ncov/hcp/disposition-in-home-patients.html     CDC: Caring for Someone: www.cdc.gov/coronavirus/2019-ncov/if-you-are-sick/care-for-someone.html     [facility]: Interim Guidance for Hospital Discharge to Home: www.health.[facility].mn.us/diseases/coronavirus/hcp/hospdischarge.pdf    [facility] clinical trials (COVID-19 research studies): clinicalaffairs.[facility]/umn-clinical-trials     Below are the COVID-19 hotlines at the [place] Department of Health ([facility]). Interpreters are available.   o For health questions: Call 551-384-9048 or 1-138.938.4832 (7 a.m. to 7 p.m.)  o For questions about schools and childcare: Call 656-923-6972 or 1-961.172.2073 (7 a.m. to 7 p.m.)              Thank you for taking steps to prevent the spread of this virus.  o Limit  your contact with others.  o Wear a simple mask to cover your cough.  o Wash your hands well and often.    Resources    [facility]: About COVID-19: [email or web address]    CDC: What to Do If You're Sick: www.cdc.gov/coronavirus/2019-ncov/about/steps-when-sick.html    CDC: Ending Home Isolation: www.cdc.gov/coronavirus/2019-ncov/hcp/disposition-in-home-patients.html     CDC: Caring for Someone: www.cdc.gov/coronavirus/2019-ncov/if-you-are-sick/care-for-someone.html     [facility]: Interim Guidance for Hospital Discharge to Home: www.[facility].mn.us/diseases/coronavirus/hcp/hospdischarge.pdf    [facility] clinical trials (COVID-19 research studies): clinicalaffairs.[facility]-clinical-trials     Below are the COVID-19 hotlines at the [place] Department of [facility] ([facility]). Interpreters are available.   o For health questions: Call 262-159-4818 or 1-480.886.2315 (7 a.m. to 7 p.m.)  o For questions about schools and childcare: Call 608-171-8848 or 1-727.986.9489 (7 a.m. to 7 p.m.)

## 2021-06-12 NOTE — PROGRESS NOTES
Assessment:     1. Fever, unspecified fever cause  Influenza A/B Rapid Test- Nasal Swab    Rapid Strep A Screen-Throat swab    Group A Strep, RNA Direct Detection, Throat    cephalexin (KEFLEX) 500 MG capsule   2. Cough  Influenza A/B Rapid Test- Nasal Swab   3. Sore throat  Rapid Strep A Screen-Throat swab    Group A Strep, RNA Direct Detection, Throat    cephalexin (KEFLEX) 500 MG capsule   4. Mastitis  cephalexin (KEFLEX) 500 MG capsule          Plan:     Patient with continuing fever, chills, and sore throat as well as cough while being treated with dicloxacillin for mastitis.  Rapid strep screen and influenza negative.  She was tested for COVID-19 earlier this week which was also negative.  It is unclear whether her continued fever and sore throat is due to incompletely treated mastitis, versus possible strep throat which result may be masked because of the dicloxacillin that she is on, versus false negative influenza, versus false negative COVID-19, versus other possible viral etiology.  Will have her stop the dicloxacillin and switch to cephalexin which should still continue to cover the mastitis to see if this will help with her symptoms.  She is agreeable with this plan.  She will follow-up in 24 to 48 hours with her primary care clinic if symptoms not significantly improved or getting worse.  Patient is agreeable with this plan.  She will continue with Tylenol or ibuprofen as needed for fever or discomfort.    Subjective:       30 y.o. female presents for evaluation of fever, sore throat, chills, body aches, and cough.  Her fever and sore throat started about 5 days ago and she was tested for COVID-19 which was negative.  She is also 3 months postpartum with a history of mastitis and had noticed that her right breast was engorged and quite sore.  She was started on dicloxacillin for the mastitis in her breast pain improved significantly but the antibiotics did not improve the fevers and chills that she has  been having.  Over the past 2 days she is developed a dry cough.  T-max has been 101  F that she noticed this morning.  She has had a bad headache as well.  She denies any associated chest pain, shortness of breath, nausea, vomiting, or diarrhea.  She has not had any loss of smell or taste.    Previous note and labs reviewed from 10/23/2020.    Patient Active Problem List   Diagnosis     Attention deficit hyperactivity disorder (ADHD), predominantly inattentive type     Esophageal reflux     Generalized anxiety disorder     Irritable bowel syndrome     PCOS (polycystic ovarian syndrome)     Mixed hyperlipidemia       Past Medical History:   Diagnosis Date     Anxiety        Past Surgical History:   Procedure Laterality Date     NO PAST SURGERIES         Current Outpatient Medications on File Prior to Visit   Medication Sig Dispense Refill     dicloxacillin (DYNAPEN) 500 MG capsule Take 1 capsule (500 mg total) by mouth 4 (four) times a day for 10 days. 40 capsule 0     norethindrone (MICRONOR) 0.35 mg tablet TK 1 T PO QD UTD       pantoprazole (PROTONIX) 40 MG tablet TAKE 1 TABLET(40 MG) BY MOUTH TWICE DAILY 90 tablet 3     prenatal no115-iron-folic acid 29 mg iron- 1 mg Chew Chew 1 tablet daily.       sertraline (ZOLOFT) 50 MG tablet Take 50 mg by mouth daily.        No current facility-administered medications on file prior to visit.        No Known Allergies    Family History   Problem Relation Age of Onset     Anxiety disorder Father      Hyperlipidemia Father      No Medical Problems Sister        Social History     Socioeconomic History     Marital status:      Spouse name: None     Number of children: None     Years of education: None     Highest education level: None   Occupational History     None   Social Needs     Financial resource strain: None     Food insecurity     Worry: None     Inability: None     Transportation needs     Medical: None     Non-medical: None   Tobacco Use     Smoking status:  Never Smoker     Smokeless tobacco: Never Used   Substance and Sexual Activity     Alcohol use: Yes     Drug use: None     Sexual activity: Yes     Partners: Male     Birth control/protection: None   Lifestyle     Physical activity     Days per week: None     Minutes per session: None     Stress: None   Relationships     Social connections     Talks on phone: None     Gets together: None     Attends Jainism service: None     Active member of club or organization: None     Attends meetings of clubs or organizations: None     Relationship status: None     Intimate partner violence     Fear of current or ex partner: None     Emotionally abused: None     Physically abused: None     Forced sexual activity: None   Other Topics Concern     None   Social History Narrative     None         Review of Systems  A 12 point comprehensive review of systems was negative except as noted.      Objective:                               General Appearance:      Vitals:    10/25/20 0841   BP: 110/78   Pulse: 98   Resp: 14   Temp: 100.3  F (37.9  C)   SpO2: 98%           Alert, mildly ill-appearing but in no distress.  Respirations unlabored.  No hot potato voice.   Head:    Normocephalic, without obvious abnormality, atraumatic   Eyes:    Conjunctiva/corneas clear   Ears:    Normal TM's without erythema or bulging. Normal external ear canals, both ears   Nose:   Nares normal, septum midline, mucosa normal, no drainage    or sinus tenderness   Throat:   Lips, mucosa, and tongue normal; teeth and gums normal.  No tonsilar hypertrophy or exudate.  There is no trismus.   Neck:   Supple, symmetrical, trachea midline, no adenopathy    Lungs:     Clear to auscultation bilaterally without wheezes, rales, or rhonchi, respirations unlabored    Heart:    Regular rate and rhythm, S1 and S2 normal, no murmur, rub or gallop       Extremities:   Extremities normal, atraumatic, no cyanosis or edema   Skin:   Skin color, texture, turgor normal, no  rashes or lesions     Recent Results (from the past 24 hour(s))   Influenza A/B Rapid Test- Nasal Swab    Specimen: Nasal Swab; Nasopharyngeal (Inpt/ED) or Nasal Mucosa (Outpt)   Result Value Ref Range    Influenza  A, Rapid Antigen No Influenza A antigen detected No Influenza A antigen detected    Influenza B, Rapid Antigen No Influenza B antigen detected No Influenza B antigen detected   Rapid Strep A Screen-Throat swab    Specimen: Throat   Result Value Ref Range    Rapid Strep A Antigen No Group A Strep detected, presumptive negative No Group A Strep detected, presumptive negative                   This note has been dictated using voice recognition software. Any grammatical or context distortions are unintentional and inherent to the software

## 2021-06-12 NOTE — PROGRESS NOTES
"Vivien Quinn is a 30 y.o. female who is being evaluated via a billable video visit.      The patient has been notified of following:     \"This video visit will be conducted via a call between you and your physician/provider. We have found that certain health care needs can be provided without the need for an in-person physical exam.  This service lets us provide the care you need with a video conversation.  If a prescription is necessary we can send it directly to your pharmacy.  If lab work is needed we can place an order for that and you can then stop by our lab to have the test done at a later time.    Video visits are billed at different rates depending on your insurance coverage. Please reach out to your insurance provider with any questions.    If during the course of the call the physician/provider feels a video visit is not appropriate, you will not be charged for this service.\"    Patient has given verbal consent to a Video visit? Yes  How would you like to obtain your AVS? AVS Preference: MyChart.  If dropped by the video visit, the video invitation should be sent to: Send to e-mail at: [email or web address]  Will anyone else be joining your video visit? No        Video Start Time: 8:20 AM    1. Flu-like symptoms  Discussed possible diagnoses including influenza a, COVID-19, other viruses.  Recommend that patient continue to take acetaminophen for fever and pain increase hydration, rest and follow-up in 2 days if she continues to run high fevers or if symptom worsens.  Plan to retest COVID-19 and influenza if symptoms persist.    Subjective:  Vivien Quinn, 30-year-old female patient who returns to the clinic via virtual visit with concern with headache, body ache and fever after testing negative for COVID-19, influenza and strep.  Patient was treated with antibiotic for mastitis which has resolved.  Patient also reported that her sore throat has also resolved.  But she continues to have fever up " to 101.5 orally at 3 AM this morning.  She continues to have headache and body aches.  Patient has no other symptoms.  Patient denied chest pain, shortness of breath and syncope.      Additional provider notes: GENERAL: Healthy, alert and no distress  EYES: Eyes grossly normal to inspection. No discharge or erythema, or obvious scleral/conjunctival abnormalities.  RESP: No audible wheeze, cough, or visible cyanosis.  No visible retractions or increased work of breathing.    NEURO: Cranial nerves grossly intact. Mentation and speech appropriate for age.  PSYCH: Mentation appears normal, affect normal/bright, judgement and insight intact, normal speech and appearance well-groomed      Video-Visit Details    Type of service:  Video Visit    Video End Time (time video stopped): 8:30 AM  Originating Location (pt. Location): Home    Distant Location (provider location):  [facility]     Platform used for Video Visit: URMILA [employer]

## 2021-06-15 NOTE — TELEPHONE ENCOUNTER
Refill Approved    Rx renewed per Medication Renewal Policy. Medication was last renewed on 8/10/20, last OV 10/27/20.    Babita Cervantes, [facility] Triage/Med Refill 2/27/2021     Requested Prescriptions   Pending Prescriptions Disp Refills     pantoprazole (PROTONIX) 40 MG tablet [Pharmacy Med Name: PANTOPRAZOLE 40MG TABLETS] 90 tablet 3     Sig: TAKE 1 TABLET(40 MG) BY MOUTH TWICE DAILY       GI Medications Refill Protocol Passed - 2/27/2021  6:58 AM        Passed - PCP or prescribing provider visit in last 12 or next 3 months.     Last office visit with prescriber/PCP: Visit date not found OR same dept: Visit date not found OR same specialty: 10/23/2020 Rajesh Loving MD  Last physical: 5/24/2019 Last MTM visit: Visit date not found   Next visit within 3 mo: Visit date not found  Next physical within 3 mo: Visit date not found  Prescriber OR PCP: Margie Euceda NP  Last diagnosis associated with med order: 1. Gastroesophageal reflux disease without esophagitis  - pantoprazole (PROTONIX) 40 MG tablet [Pharmacy Med Name: PANTOPRAZOLE 40MG TABLETS]; TAKE 1 TABLET(40 MG) BY MOUTH TWICE DAILY  Dispense: 90 tablet; Refill: 3    If protocol passes may refill for 12 months if within 3 months of last provider visit (or a total of 15 months).

## 2021-06-15 NOTE — PROGRESS NOTES
Chief Complaint   Patient presents with     Blepharitis     x 2/d's         HPI    Patient is here for 2 days of redness and swelling of left upper eyelid with mild itching and tenderness. No conjunctival erythema, eye discharge, visual acuity changes, photophobia, foreign body sensation. She has not had her contacts on x 2 wks.    ROS: Pertinent ROS noted in HPI.     No Known Allergies    There is no problem list on file for this patient.      No family history on file.    Social History     Social History     Marital status:      Spouse name: N/A     Number of children: N/A     Years of education: N/A     Occupational History     Not on file.     Social History Main Topics     Smoking status: Never Smoker     Smokeless tobacco: Never Used     Alcohol use Not on file     Drug use: Not on file     Sexual activity: Not on file     Other Topics Concern     Not on file     Social History Narrative         Objective:    Vitals:    02/08/18 1713   BP: 100/60   Pulse: 81   Resp: 18   Temp: 97.8  F (36.6  C)   SpO2: 100%       Gen:NAD  Eyes: mild erythema and induration at left upper medial eyelid without. Left lower eyelid normal. Left conjunctiva normal. Right conjunctiva and eyelids normal. Corneas grossly clear. PERRLA.     Stye  -     tobramycin (TOBREX) 0.3 % ophthalmic solution; Administer 1 drop into the left eye every 4 (four) hours for 10 days.      Advised warm compresses. She is going out of town in 2 days, so will give Tobrex to use if warm compresses don't provide any improvement over the next few days or if symptoms worsen. I provided [facility] phone number for follow up if symptoms worsen or fail to improve as expected.

## 2021-06-17 NOTE — PATIENT INSTRUCTIONS - HE
Patient Instructions by Joon Wilson PA-C at 7/16/2019  4:10 PM     Author: Joon Wilson PA-C Service: -- Author Type: Physician Assistant    Filed: 7/16/2019  4:34 PM Encounter Date: 7/16/2019 Status: Addendum    : Joon Wilson PA-C (Physician Assistant)    Related Notes: Original Note by Joon Wilson PA-C (Physician Assistant) filed at 7/16/2019  4:34 PM       You were seen today for conjunctivitis.    Management:  - Apply antibiotic medication as prescribed until 24 hours of no symptoms  - Use warm compresses to clear discharge and crust  - Encourage good hand hygiene with frequent hand washing  - Avoid itching or rubbing the eye  - Discontinue contact lens wear until 100% resolution of your symptoms    Reasons to come back:  - If symptoms have not improved in 3-5 days  - Develop excessive pus-like discharge and/or can't keep eyes open  - Develop a fever, cough, ear pain, or shortness of breath      Patient Education     Conjunctivitis, Nonspecific    The membrane that covers the white part of your eye (the conjunctiva) is inflamed. Inflammation happens when your body responds to an injury, allergic reaction, infection, or illness. Symptoms of inflammation in the eye may include redness, irritation, itching, swelling, or burning. These symptoms should go away within the next 24 hours. Conjunctivitis may be related to a particle that was in your eye. If so, it may wash out with your tears or irrigation treatment. Being exposed to liquid chemicals or fumes may also cause this reaction.   Home care    Apply a cold pack over the eye for 20 minutes at a time. This will reduce pain. To make a cold pack, put ice cubes in a plastic bag that seals at the top. Wrap the bag in a clean, thin towel or cloth.    Artificial tears may be prescribed to reduce irritation or redness.  These should be used 3 to 4 times a day.    You may use acetaminophen or ibuprofen to control pain, unless another medicine was prescribed.  (Note: If you have chronic liver or kidney disease, or if you have ever had a stomach ulcer or gastrointestinal bleeding, talk with your healthcare provider before using these medicines.)  Follow-up care  Follow up with your healthcare provider, or as advised.  When to seek medical advice  Call your healthcare provider right away if any of these occur:    Increased eyelid swelling    Increased eye pain    Increased redness or drainage from the eye    Increased blurry vision or increased sensitivity to light    Failure of normal vision to return within 24 to 48 hours  Date Last Reviewed: 7/1/2017 2000-2017 [employer]. [address] [place], [place] [id number]. All rights reserved. This information is not intended as a substitute for professional medical care. Always follow your healthcare professional's instructions.

## 2021-06-18 NOTE — PATIENT INSTRUCTIONS - HE
Patient Instructions by Joon Wilson PA-C at 4/2/2021 10:20 AM     Author: Joon Wilson PA-C Service: -- Author Type: Physician Assistant    Filed: 4/2/2021 11:59 AM Encounter Date: 4/2/2021 Status: Addendum    : Joon Wilson PA-C (Physician Assistant)    Related Notes: Original Note by Joon Wilson PA-C (Physician Assistant) filed at 4/2/2021 11:15 AM       Suggested increased rest increased fluids and bedside humidification  Over-the-counter Tylenol for comfort.  Follow packaging directions  Over-the-counter throat lozenges with benzocaine such as Cepacol may be used if indicated and is not a choking hazard based on age.  Follow packaging directions.  Do not overuse the benzocaine as it will dry the throat and make it uncomfortable.  Noncontagious after 24 hours on the antibiotic.  Change toothbrush out after 48 hours to avoid reinfecting the mouth.  Follow-up after completion of the antibiotic if new symptoms or concerns arise.        As a result of our visit today, here are the action plans for you:    1. Medication(s) to stop: There are no discontinued medications.    2. Medication(s) to start or change:   Medications Ordered   Medications   ? penicillin VK (PEN VK) 500 MG tablet     Sig: Take 1 tablet (500 mg total) by mouth 2 (two) times a day for 10 days.     Dispense:  20 tablet     Refill:  0       3. Other instructions: Yes      Self-Care for Sore Throats  Sore throats happen for many reasons, such as colds, allergies, and infections caused by viruses or bacteria. In any case, your throat becomes red and sore. Your goal for self-care is to reduce your discomfort while giving your throat a chance to heal.    Moisten and soothe your throat  Tips include the following:    Try a sip of water first thing after waking up.    Keep your throat moist by drinking 6 or more glasses of clear liquids every day.    Run a cool-air humidifier in your room overnight.    Avoid cigarette smoke.     Suck on throat  lozenges, cough drops, hard candy, ice chips, or frozen fruit-juice bars. Use the sugar-free versions if your diet or medical condition requires them.  Gargle to ease irritation  Gargling every hour or 2 can ease irritation. Try gargling with 1 of these solutions:    1/4 teaspoon of salt in 1/2 cup of warm water    An over-the-counter anesthetic gargle  Use medicine for more relief  Over-the-counter medicine can reduce sore throat symptoms. Ask your pharmacist if you have questions about which medicine to use:    Ease pain with anesthetic sprays. Aspirin or an aspirin substitute also helps. Remember, never give aspirin to anyone 18 or younger, or if you are already taking blood thinners.     For sore throats caused by allergies, try antihistamines to block the allergic reaction.    Remember: unless a sore throat is caused by a bacterial infection, antibiotics wont help you.  Prevent future sore throats  Prevention tips include the following:    Stop smoking or reduce contact with secondhand smoke. Smoke irritates the tender throat lining.    Limit contact with pets and with allergy-causing substances, such as pollen and mold.    When youre around someone with a sore throat or cold, wash your hands often to keep viruses or bacteria from spreading.    Dont strain your vocal cords.  Call your healthcare provider  Contact your healthcare provider if you have:    A temperature over 101 F (38.3 C)    White spots on the throat    Great difficulty swallowing    Trouble breathing    A skin rash    Recent exposure to someone else with strep bacteria    Severe hoarseness and swollen glands in the neck or jaw   Date Last Reviewed: 8/1/2016 2000-2016 [employer]. [address] [place] [address], [place] [address]. All rights reserved. This information is not intended as a substitute for professional medical care. Always follow your healthcare professional's instructions.

## 2021-06-18 NOTE — PATIENT INSTRUCTIONS - HE
Patient Instructions by Karolina Cunningham MD at 10/25/2020  8:10 AM     Author: Karolina Cunningham MD Service: -- Author Type: Physician    Filed: 10/25/2020  9:00 AM Encounter Date: 10/25/2020 Status: Signed    : Karolina Cunningham MD (Physician)         Patient Education     Mastitis  Mastitis occurs when breast tissue becomes swollen and inflamed. This is almost always due to infection. Mastitis most often affects breastfeeding women during the first 6 weeks after childbirth. For this reason, its also known as lactation mastitis. Infection may happen after a duct becomes clogged, causing milk to back up in the breast. Mastitis may also occur if bacteria enter the breast through small cracks in the nipple. (Less often, mastitis occurs in women who arent breastfeeding. If you have mastitis that is not due to breastfeeding, your healthcare provider will give you more information as needed. Treatment may include some of the same home care measures listed below.)  Mastitis may cause flu-like symptoms such as fever, aches, and fatigue. The affected breast may feel painful, warm, tender, firm, or swollen. The skin over the breast may be red (often in a wedge-shaped pattern). You may feel a burning a sensation when breastfeeding.  In most cases, mastitis can be treated with antibiotics. This should clear the infection. If treatment is delayed, a pocket of pus (abscess) can form in the breast tissue. A procedure may then be needed to drain the pus. In severe cases of infection, other treatments may be needed.  Home care  Breastfeeding    Its very important to keep the milk flowing from the infected breast. Continue breastfeeding from both breasts as usual. This will not hurt the baby. If this is too painful, use a breast pump to remove milk from the infected side. This can be fed to your baby or discarded. Note: If you don't continue to breastfeed or pump your breast, bacteria can grow in the milk that is  left in your breast. This can make your infection worse.    Tell your healthcare provider if you have problems with breastfeeding. He or she may suggest changes to your technique, if needed. You may also be referred to a lactation nurse or consultant for support with breastfeeding.  General care    Take any medicines youre prescribed as directed. If youre taking antibiotics, be sure to complete all of the medicine even if you start to feel better. Over-the-counter pain medicines may also be recommended. Dont use breast creams or other products or medicines without talking to your healthcare provider first. Note: If youre concerned about taking medicines while breastfeeding, talk to your healthcare provider.    Rest as often as needed. Also be sure to drink plenty of fluids.    To help relieve pain and swelling, heat or ice may be used. Apply as often as directed by your provider.  ? Heat: Place a warm compress on the breast. Use a towel soaked in hot water, a heating pad, or a hot water bottle.  ? Cold: Place a cold compress on the breast. Use an ice pack or bag of ice wrapped in a thin towel. Never place a cold source directly on the skin.  Follow-up care  Follow up with your healthcare provider as advised.  When to seek medical advice  Call your healthcare provider right away if any of these occur:    Fever of 100.4 F (38 C) or higher, or as directed by your provider    Shaking chills    Symptoms worsen or do not improve within 48 to 72 hours of starting treatment    New symptoms develop  Date Last Reviewed: 7/30/2015 2000-2017 The [employer]. [address] [place] [address], [place] [address]. All rights reserved. This information is not intended as a substitute for professional medical care. Always follow your healthcare professional's instructions.           Patient Education     Self-Care for Sore Throats    Sore throats happen for many reasons, such as colds, allergies, cigarette smoke, air pollution,  and infections caused by viruses or bacteria. In any case, your throat becomes red and sore. Your goal for self-care is to reduce your discomfort while giving your throat a chance to heal.  Moisten and soothe your throat  Tips include the following:    Try a sip of water first thing after waking up.    Keep your throat moist by drinking 6 or more glasses of clear liquids every day.    Run a cool-air humidifier in your room overnight.    Avoid cigarette smoke.     If air pollution gives you a sore throat, check the air quality index and, on high pollution days, try to limit outdoor time.    Suck on throat lozenges, cough drops, hard candy, ice chips, or frozen fruit-juice bars. Use the sugar-free versions if your diet or medical condition requires them.  Gargle to ease irritation  Gargling every hour or 2 can ease irritation. Try gargling with 1 of these solutions:    1/4 teaspoon of salt in 1/2 cup of warm water    An over-the-counter anesthetic gargle  Use medicine for more relief  Over-the-counter medicine can reduce sore throat symptoms. Ask your pharmacist if you have questions about which medicine to use and, to prevent possible drug interactions, be certain to let the pharmacist know what medications you take. To decrease symptoms:    Ease pain with anesthetic sprays. Aspirin or an aspirin substitute also helps. Remember, never give aspirin to anyone 18 or younger, or if you are already taking blood thinners.     For sore throats caused by allergies, try antihistamines to block the allergic reaction.    Remember: unless a sore throat is caused by a bacterial infection, antibiotics wont help you.  Prevent future sore throats  Prevention tips include the following:    Stop smoking or reduce contact with secondhand smoke. Smoke irritates the tender throat lining.    Limit contact with pets and with allergy-causing substances, such as pollen and mold.    When youre around someone with a sore throat or cold, wash  your hands often to keep viruses or bacteria from spreading.    Limit outdoor time when air pollution particulates are high    Dont strain your vocal cords.  Contact your healthcare provider if you have:    A temperature over 101 F (38.3 C)    White spots on the throat    Great difficulty swallowing    Trouble breathing    A skin rash    Recent exposure to someone else with strep bacteria    Severe hoarseness and swollen glands in the neck or jaw  Date Last Reviewed: 8/1/2016 2000-2019 [employer]. [address] [place] [address]. All rights reserved. This information is not intended as a substitute for professional medical care. Always follow your healthcare professional's instructions.

## 2021-06-18 NOTE — PATIENT INSTRUCTIONS - HE
Patient Instructions by Sal Chew PA-C at 12/30/2020  2:20 PM     Author: Sal Chew PA-C Service: -- Author Type: Physician Assistant    Filed: 12/30/2020  3:15 PM Encounter Date: 12/30/2020 Status: Signed    : Sal Chew PA-C (Physician Assistant)       Patient Education     Rib Contusion or Minor Fracture    A rib contusion is a bruise to one or more rib bones. It may cause pain, tenderness, swelling, and a purplish tint to the skin. There may be a sharp pain with each breath. A rib contusion takes anywhere from a few days to a few weeks to heal. A minor rib fracture or break may cause the same symptoms as a rib contusion. The small crack may not be seen on a regular chest X-ray. Treatment for both problems is the same.  Home care    You may use over-the-counter pain medicine to control pain, unless another pain medicine was prescribed. If you have chronic liver or kidney disease or ever had a stomach ulcer or GI bleeding, talk with your healthcare provider before using these medicines.    Rest. Do not lift anything heavy or do any activity that causes pain.    Apply an ice pack over the injured area for 15 to 20 minutes every 1 to 2 hours. You should do this for the first 24 to 48 hours. You can make an ice pack by filling a plastic bag that seals at the top with ice cubes and then wrapping it with a thin towel. Continue with ice packs as needed for the relief of pain and swelling.    The first 3 to 4 weeks of healing will be the most painful. If your pain is not under control with the treatment given, call your healthcare provider. Sometimes a stronger pain medicine may be needed. A nerve block can be done in case of severe pain. It will numb the nerve between the ribs.  Follow-up care  Follow up with your healthcare provider, or as advised.  If X-rays were taken, you will be told of any new findings that may affect your care.  Call 911  Call 911 if you have:    Dizziness,  weakness or fainting    Shortness of breath with or without chest discomfort    New or worsening pain  When to seek medical advice  Call your healthcare provider right away if any of these occur:    Fever of 100.4 F (38 C) or higher, or as directed by your healthcare provider    Stomach pain  Date Last Reviewed: 12/2/2015 2000-2017 The [employer]. [address] [place] [address]. All rights reserved. This information is not intended as a substitute for professional medical care. Always follow your healthcare professional's instructions.

## 2021-06-27 NOTE — PROGRESS NOTES
"Progress Notes by Joon Wilson PA-C at 7/16/2019  4:10 PM     Author: Joon Wilson PA-C Service: -- Author Type: Physician Assistant    Filed: 7/16/2019  4:42 PM Encounter Date: 7/16/2019 Status: Signed    : Joon Wilson PA-C (Physician Assistant)       Subjective:      Patient ID: Vivien Quinn is a 28 y.o. female.    Chief Complaint:    HPI  Vivien Quinn is a 28 y.o. female who wears contact lenses who presents today complaining of bilateral red mattering and gouping eyes.  Patient has a child who is in  and also has had exposure to \"pinkeye\" and has red eyes as well.  Mother is now symptomatic.  Patient has discontinued her contact lens wear at this morning is wearing glasses and presents to clinic for evaluation and treatment.  At this time she has no foreign body sensation, no visual disturbances and no active tearing drainage or mattering or discharge from the eyes currently.  She also denies fever chills night sweats or fatigue.      Past Medical History:   Diagnosis Date   ? Anxiety        Past Surgical History:   Procedure Laterality Date   ? NO PAST SURGERIES         Family History   Problem Relation Age of Onset   ? Anxiety disorder Father    ? Hyperlipidemia Father    ? No Medical Problems Sister        Social History     Tobacco Use   ? Smoking status: Never Smoker   ? Smokeless tobacco: Never Used   Substance Use Topics   ? Alcohol use: Yes   ? Drug use: Not on file       Review of Systems  As above in HPI, otherwise balance of Review of Systems are negative.    Objective:     /60 (Patient Site: Right Arm, Patient Position: Sitting, Cuff Size: Adult Regular)   Pulse 72   Temp 97.8  F (36.6  C) (Oral)   Resp 16   Wt 150 lb 9.6 oz (68.3 kg)   SpO2 100%   Breastfeeding? No   BMI 23.94 kg/m      Physical Exam  General: Patient is resting comfortably no acute distress is afebrile  HEENT: Head is normocephalic atraumatic   eyes are PERRL EOMI sclera are noninjected " but the conjunctiva are erythematous bilaterally.  No mattering or discharge at the palpebral commissure.  No pre-or postauricular lymphadenopathy  TMs are clear bilaterally  Throat is clear  No cervical lymphadenopathy present  LUNGS: Clear to auscultation bilaterally  HEART: Regular rate and rhythm  Skin: Without rash non-diaphoretic      Assessment:     Procedures    The primary encounter diagnosis was Acute bacterial conjunctivitis of both eyes. A diagnosis of Wears contact lenses was also pertinent to this visit.    Plan:     1. Acute bacterial conjunctivitis of both eyes  polymyxin B-trimethoprim (POLYTRIM) 10,000 unit- 1 mg/mL Drop ophthalmic drops   2. Wears contact lenses  polymyxin B-trimethoprim (POLYTRIM) 10,000 unit- 1 mg/mL Drop ophthalmic drops       We had a conversation stating that she should discontinue contact lens wear completely until 100% resolution.  After she gets good clearing of the eyes plus 2 days of the antibiotic therapy she will then use a new pair of soft contact lens wear to follow her normal disinfection routine.  She will follow-up with primary care provider for reevaluation treatment if anything less than 100% resolution. Questions were answered to patient's satisfaction before discharge.    Patient Instructions   You were seen today for conjunctivitis.    Management:  - Apply antibiotic medication as prescribed until 24 hours of no symptoms  - Use warm compresses to clear discharge and crust  - Encourage good hand hygiene with frequent hand washing  - Avoid itching or rubbing the eye  - Discontinue contact lens wear until 100% resolution of your symptoms    Reasons to come back:  - If symptoms have not improved in 3-5 days  - Develop excessive pus-like discharge and/or can't keep eyes open  - Develop a fever, cough, ear pain, or shortness of breath      Patient Education     Conjunctivitis, Nonspecific    The membrane that covers the white part of your eye (the conjunctiva) is  inflamed. Inflammation happens when your body responds to an injury, allergic reaction, infection, or illness. Symptoms of inflammation in the eye may include redness, irritation, itching, swelling, or burning. These symptoms should go away within the next 24 hours. Conjunctivitis may be related to a particle that was in your eye. If so, it may wash out with your tears or irrigation treatment. Being exposed to liquid chemicals or fumes may also cause this reaction.   Home care    Apply a cold pack over the eye for 20 minutes at a time. This will reduce pain. To make a cold pack, put ice cubes in a plastic bag that seals at the top. Wrap the bag in a clean, thin towel or cloth.    Artificial tears may be prescribed to reduce irritation or redness.  These should be used 3 to 4 times a day.    You may use acetaminophen or ibuprofen to control pain, unless another medicine was prescribed. (Note: If you have chronic liver or kidney disease, or if you have ever had a stomach ulcer or gastrointestinal bleeding, talk with your healthcare provider before using these medicines.)  Follow-up care  Follow up with your healthcare provider, or as advised.  When to seek medical advice  Call your healthcare provider right away if any of these occur:    Increased eyelid swelling    Increased eye pain    Increased redness or drainage from the eye    Increased blurry vision or increased sensitivity to light    Failure of normal vision to return within 24 to 48 hours  Date Last Reviewed: 7/1/2017 2000-2017 [employer]. [address] [place] [address]. All rights reserved. This information is not intended as a substitute for professional medical care. Always follow your healthcare professional's instructions.

## 2021-06-30 NOTE — PROGRESS NOTES
Progress Notes by Joon Wilson PA-C at 4/2/2021 10:20 AM     Author: Joon Wilson PA-C Service: -- Author Type: Physician Assistant    Filed: 4/3/2021  5:22 PM Encounter Date: 4/2/2021 Status: Signed    : Joon Wilson PA-C (Physician Assistant)       Chief Complaint   Patient presents with   ? Sore Throat     states very sore feeling   ? Fever     off and on.  took ibuprofen this morning   ? Fatigue     off and on.  all symptoms off and on for several days - tested for covid on sundy ( 5 days ago and came back negative)          Clinical Decision Making:  Multiple etiologies and diagnoses were considered to include but not limited to strep throat, viral pharyngitis, mononucleosis, tonsillitis. Patient had a negative mononucleosis screen and a slightly elevated white count.  She will be treated with antibiotic for presumptive strep throat based on the exudate and the slightly elevated white count.  Expected course of resolution and indication for return was gone over and questions were answered to patient satisfaction before discharge.    At the end of the encounter, I discussed results, diagnosis, medications. Discussed red flags for immediate return to clinic/ER, as well as indications for follow up if no improvement. Patient understood and agreed to plan. Patient was stable for discharge.    1. Exudative pharyngitis  Mononucleosis Screen    HM1(CBC and Differential)    penicillin VK (PEN VK) 500 MG tablet   2. Throat pain  Rapid Strep A Screen-Throat    Mononucleosis Screen    HM1(CBC and Differential)    Group A Strep PCR Throat Swab    penicillin VK (PEN VK) 500 MG tablet         Patient Instructions     Suggested increased rest increased fluids and bedside humidification  Over-the-counter Tylenol for comfort.  Follow packaging directions  Over-the-counter throat lozenges with benzocaine such as Cepacol may be used if indicated and is not a choking hazard based on age.  Follow packaging directions.  Do  not overuse the benzocaine as it will dry the throat and make it uncomfortable.  Noncontagious after 24 hours on the antibiotic.  Change toothbrush out after 48 hours to avoid reinfecting the mouth.  Follow-up after completion of the antibiotic if new symptoms or concerns arise.        As a result of our visit today, here are the action plans for you:    1. Medication(s) to stop: There are no discontinued medications.    2. Medication(s) to start or change:   Medications Ordered   Medications   ? penicillin VK (PEN VK) 500 MG tablet     Sig: Take 1 tablet (500 mg total) by mouth 2 (two) times a day for 10 days.     Dispense:  20 tablet     Refill:  0       3. Other instructions: Yes      Self-Care for Sore Throats  Sore throats happen for many reasons, such as colds, allergies, and infections caused by viruses or bacteria. In any case, your throat becomes red and sore. Your goal for self-care is to reduce your discomfort while giving your throat a chance to heal.    Moisten and soothe your throat  Tips include the following:    Try a sip of water first thing after waking up.    Keep your throat moist by drinking 6 or more glasses of clear liquids every day.    Run a cool-air humidifier in your room overnight.    Avoid cigarette smoke.     Suck on throat lozenges, cough drops, hard candy, ice chips, or frozen fruit-juice bars. Use the sugar-free versions if your diet or medical condition requires them.  Gargle to ease irritation  Gargling every hour or 2 can ease irritation. Try gargling with 1 of these solutions:    1/4 teaspoon of salt in 1/2 cup of warm water    An over-the-counter anesthetic gargle  Use medicine for more relief  Over-the-counter medicine can reduce sore throat symptoms. Ask your pharmacist if you have questions about which medicine to use:    Ease pain with anesthetic sprays. Aspirin or an aspirin substitute also helps. Remember, never give aspirin to anyone 18 or younger, or if you are already  taking blood thinners.     For sore throats caused by allergies, try antihistamines to block the allergic reaction.    Remember: unless a sore throat is caused by a bacterial infection, antibiotics wont help you.  Prevent future sore throats  Prevention tips include the following:    Stop smoking or reduce contact with secondhand smoke. Smoke irritates the tender throat lining.    Limit contact with pets and with allergy-causing substances, such as pollen and mold.    When youre around someone with a sore throat or cold, wash your hands often to keep viruses or bacteria from spreading.    Dont strain your vocal cords.  Call your healthcare provider  Contact your healthcare provider if you have:    A temperature over 101 F (38.3 C)    White spots on the throat    Great difficulty swallowing    Trouble breathing    A skin rash    Recent exposure to someone else with strep bacteria    Severe hoarseness and swollen glands in the neck or jaw   Date Last Reviewed: 8/1/2016 2000-2016 [employer]. [address] [place] [address]. All rights reserved. This information is not intended as a substitute for professional medical care. Always follow your healthcare professional's instructions.             HPI:  Vivien Quinn is a 30 y.o. female who presents today for an intermittent sore throat and low-grade subjective fever since this morning.  Patient used over-the-counter ibuprofen without relief.  Patient has had a total of 5 days of symptoms with intermittent sore throat and was tested for Covid on Sunday, 5 days ago, and the screening test was negative.    History obtained from chart review and the patient.    Problem List:  2019-05: Mixed hyperlipidemia  2013-10: Attention deficit hyperactivity disorder (ADHD),   predominantly inattentive type  2013-10: Irritable bowel syndrome  2013-10: PCOS (polycystic ovarian syndrome)  2013-08: Esophageal reflux  2013-08: Generalized anxiety  disorder      Past Medical History:   Diagnosis Date   ? Anxiety        Social History     Tobacco Use   ? Smoking status: Never Smoker   ? Smokeless tobacco: Never Used   Substance Use Topics   ? Alcohol use: Yes       Review of Systems  As above in HPI otherwise negative.    Vitals:    04/02/21 1028   BP: 100/60   Patient Site: Right Arm   Patient Position: Sitting   Cuff Size: Adult Regular   Pulse: (!) 112   Resp: 18   Temp: 98.4  F (36.9  C)   TempSrc: Oral   SpO2: 98%   Weight: 163 lb 3.2 oz (74 kg)       Physical Exam    General: Patient is resting comfortably no acute distress is afebrile  HEENT: Head is normocephalic atraumatic   eyes are PERRL EOMI sclera anicteric   TMs are clear bilaterally  Throat is with mild pharyngeal wall erythema and slight exudate on one of the tonsils.  No cervical lymphadenopathy present  LUNGS: Clear to auscultation bilaterally  HEART: Regular rate and rhythm  Skin: Without rash non-diaphoretic        Labs:  Recent Results (from the past 72 hour(s))   Rapid Strep A Screen-Throat    Specimen: Throat   Result Value Ref Range    Rapid Strep A Antigen No Group A Strep detected, presumptive negative No Group A Strep detected, presumptive negative   Group A Strep PCR Throat Swab    Specimen: Throat   Result Value Ref Range    Group Strep A by PCR Invalid No Group A Strep detected, Invalid, ERROR   Mononucleosis Screen   Result Value Ref Range    Mono Screen Negative Negative   HM1 (CBC with Diff)   Result Value Ref Range    WBC 11.5 (H) 4.0 - 11.0 thou/uL    RBC 4.67 3.80 - 5.40 mill/uL    Hemoglobin 13.5 12.0 - 16.0 g/dL    Hematocrit 40.1 35.0 - 47.0 %    MCV 86 80 - 100 fL    MCH 28.9 27.0 - 34.0 pg    MCHC 33.7 32.0 - 36.0 g/dL    RDW 13.5 11.0 - 14.5 %    Platelets 276 140 - 440 thou/uL    MPV 8.4 7.0 - 10.0 fL    Neutrophils % 65 50 - 70 %    Lymphocytes % 24 20 - 40 %    Monocytes % 11 (H) 2 - 10 %    Eosinophils % 1 0 - 6 %    Basophils % 0 0 - 2 %    Immature Granulocyte % 0  <=0 %    Neutrophils Absolute 7.4 2.0 - 7.7 thou/uL    Lymphocytes Absolute 2.7 0.8 - 4.4 thou/uL    Monocytes Absolute 1.2 (H) 0.0 - 0.9 thou/uL    Eosinophils Absolute 0.1 0.0 - 0.4 thou/uL    Basophils Absolute 0.0 0.0 - 0.2 thou/uL    Immature Granulocyte Absolute 0.0 <=0.0 thou/uL

## 2021-06-30 NOTE — PROGRESS NOTES
Progress Notes by Sal Chew PA-C at 12/30/2020  2:20 PM     Author: Sal Chew PA-C Service: -- Author Type: Physician Assistant    Filed: 12/30/2020  3:21 PM Encounter Date: 12/30/2020 Status: Signed    : Sal Chew PA-C (Physician Assistant)         Assessment & Plan:       1. Rib pain on right side  XR Ribs Right W PA Chest    ibuprofen tablet 600 mg (ADVIL,MOTRIN)      Medical Decision Making  Patient presents with acute onset right rib pain after fall from skiing.  Patient otherwise shows no respiratory distress, has clear lung sounds, and oxygen saturation 100% on room air.  No palpation of crepitus on the skin may concern for pneumothorax low.  Chest x-ray was negative for rib fracture and pneumothorax as well.  Recommend cool compresses, alternating over-the-counter analgesics, and rest.  Discussed signs of worsening symptoms and when to follow-up with PCP if no symptom improvement.    Patient Instructions   Patient Education     Rib Contusion or Minor Fracture    A rib contusion is a bruise to one or more rib bones. It may cause pain, tenderness, swelling, and a purplish tint to the skin. There may be a sharp pain with each breath. A rib contusion takes anywhere from a few days to a few weeks to heal. A minor rib fracture or break may cause the same symptoms as a rib contusion. The small crack may not be seen on a regular chest X-ray. Treatment for both problems is the same.  Home care    You may use over-the-counter pain medicine to control pain, unless another pain medicine was prescribed. If you have chronic liver or kidney disease or ever had a stomach ulcer or GI bleeding, talk with your healthcare provider before using these medicines.    Rest. Do not lift anything heavy or do any activity that causes pain.    Apply an ice pack over the injured area for 15 to 20 minutes every 1 to 2 hours. You should do this for the first 24 to 48 hours. You can make an ice pack by  filling a plastic bag that seals at the top with ice cubes and then wrapping it with a thin towel. Continue with ice packs as needed for the relief of pain and swelling.    The first 3 to 4 weeks of healing will be the most painful. If your pain is not under control with the treatment given, call your healthcare provider. Sometimes a stronger pain medicine may be needed. A nerve block can be done in case of severe pain. It will numb the nerve between the ribs.  Follow-up care  Follow up with your healthcare provider, or as advised.  If X-rays were taken, you will be told of any new findings that may affect your care.  Call 911  Call 911 if you have:    Dizziness, weakness or fainting    Shortness of breath with or without chest discomfort    New or worsening pain  When to seek medical advice  Call your healthcare provider right away if any of these occur:    Fever of 100.4 F (38 C) or higher, or as directed by your healthcare provider    Stomach pain  Date Last Reviewed: 12/2/2015 2000-2017 [employer]. [address] [place] [address]. All rights reserved. This information is not intended as a substitute for professional medical care. Always follow your healthcare professional's instructions.                 Subjective:       Vivien Quinn is a 30 y.o. female here for evaluation of rib pain.  Event of injury occurred 3 hours ago.  Patient was downhill skiing when she fell.  Patient lunged forward and landed on her right arm and chest.  She denies loss of consciousness and was able to continue skiing on 2 more routes before deciding to come in.  She notes most of her pain along the right lower sternum and right anterior chest wall.  Patient does have increased pain with sneezing, coughing, or taking deep breaths.  However, she denies feeling short of breath.  She has no history of previous fractures to the ribs.  Patient has not tried any therapies to help with symptoms.  She rates  pain as 9 out of 10 in severity when she is moving or the pain is provoked.    The following portions of the patient's history were reviewed and updated as appropriate: allergies, current medications and problem list.    Review of Systems  Pertinent items are noted in HPI.     Allergies  No Known Allergies    Family History   Problem Relation Age of Onset   ? Anxiety disorder Father    ? Hyperlipidemia Father    ? No Medical Problems Sister        Social History     Socioeconomic History   ? Marital status:      Spouse name: None   ? Number of children: None   ? Years of education: None   ? Highest education level: None   Occupational History   ? None   Social Needs   ? Financial resource strain: None   ? Food insecurity     Worry: None     Inability: None   ? Transportation needs     Medical: None     Non-medical: None   Tobacco Use   ? Smoking status: Never Smoker   ? Smokeless tobacco: Never Used   Substance and Sexual Activity   ? Alcohol use: Yes   ? Drug use: None   ? Sexual activity: Yes     Partners: Male     Birth control/protection: None   Lifestyle   ? Physical activity     Days per week: None     Minutes per session: None   ? Stress: None   Relationships   ? Social connections     Talks on phone: None     Gets together: None     Attends Rastafari service: None     Active member of club or organization: None     Attends meetings of clubs or organizations: None     Relationship status: None   ? Intimate partner violence     Fear of current or ex partner: None     Emotionally abused: None     Physically abused: None     Forced sexual activity: None   Other Topics Concern   ? None   Social History Narrative   ? None         Objective:       /83   Pulse (!) 112   Resp 18   Wt 164 lb (74.4 kg)   SpO2 100%   BMI 26.07 kg/m    General appearance: alert, appears stated age, cooperative, no distress and non-toxic  Lungs: clear to auscultation bilaterally   Chest wall: Tenderness to palpation at  the lower, anterior, right ribs as well as the lower right sternum  Heart: Tachycardic, normal S1 and S2, no murmurs rubs or gallops  Skin: No palpation of crepitus, skin intact, no ecchymosis or swelling, no erythema or increased warmth to touch

## 2021-10-03 ENCOUNTER — HEALTH MAINTENANCE LETTER (OUTPATIENT)
Age: 31
End: 2021-10-03

## 2021-10-28 ENCOUNTER — TRANSFERRED RECORDS (OUTPATIENT)
Dept: HEALTH INFORMATION MANAGEMENT | Facility: CLINIC | Age: 31
End: 2021-10-28
Payer: COMMERCIAL

## 2022-01-25 ENCOUNTER — OFFICE VISIT (OUTPATIENT)
Dept: FAMILY MEDICINE | Facility: CLINIC | Age: 32
End: 2022-01-25
Payer: COMMERCIAL

## 2022-01-25 VITALS
DIASTOLIC BLOOD PRESSURE: 60 MMHG | HEIGHT: 68 IN | HEART RATE: 76 BPM | SYSTOLIC BLOOD PRESSURE: 110 MMHG | WEIGHT: 169 LBS | BODY MASS INDEX: 25.61 KG/M2

## 2022-01-25 DIAGNOSIS — F41.1 GENERALIZED ANXIETY DISORDER: ICD-10-CM

## 2022-01-25 DIAGNOSIS — Z00.00 ROUTINE GENERAL MEDICAL EXAMINATION AT A HEALTH CARE FACILITY: Primary | ICD-10-CM

## 2022-01-25 DIAGNOSIS — N39.3 FEMALE STRESS INCONTINENCE: ICD-10-CM

## 2022-01-25 DIAGNOSIS — K21.9 GASTROESOPHAGEAL REFLUX DISEASE WITHOUT ESOPHAGITIS: ICD-10-CM

## 2022-01-25 DIAGNOSIS — Z13.1 SCREENING FOR DIABETES MELLITUS: ICD-10-CM

## 2022-01-25 DIAGNOSIS — E78.2 MIXED HYPERLIPIDEMIA: ICD-10-CM

## 2022-01-25 PROCEDURE — 90686 IIV4 VACC NO PRSV 0.5 ML IM: CPT | Performed by: NURSE PRACTITIONER

## 2022-01-25 PROCEDURE — 90471 IMMUNIZATION ADMIN: CPT | Performed by: NURSE PRACTITIONER

## 2022-01-25 PROCEDURE — 99395 PREV VISIT EST AGE 18-39: CPT | Mod: 25 | Performed by: NURSE PRACTITIONER

## 2022-01-25 PROCEDURE — 0054A COVID-19,PF,PFIZER (12+ YRS): CPT | Performed by: NURSE PRACTITIONER

## 2022-01-25 PROCEDURE — 99213 OFFICE O/P EST LOW 20 MIN: CPT | Mod: 25 | Performed by: NURSE PRACTITIONER

## 2022-01-25 PROCEDURE — 91305 COVID-19,PF,PFIZER (12+ YRS): CPT | Performed by: NURSE PRACTITIONER

## 2022-01-25 RX ORDER — PANTOPRAZOLE SODIUM 40 MG/1
TABLET, DELAYED RELEASE ORAL
Qty: 180 TABLET | Refills: 3 | Status: SHIPPED | OUTPATIENT
Start: 2022-01-25

## 2022-01-25 RX ORDER — PANTOPRAZOLE SODIUM 40 MG/1
TABLET, DELAYED RELEASE ORAL
COMMUNITY
Start: 2016-01-01 | End: 2022-01-25

## 2022-01-25 ASSESSMENT — ANXIETY QUESTIONNAIRES
1. FEELING NERVOUS, ANXIOUS, OR ON EDGE: SEVERAL DAYS
3. WORRYING TOO MUCH ABOUT DIFFERENT THINGS: SEVERAL DAYS
6. BECOMING EASILY ANNOYED OR IRRITABLE: SEVERAL DAYS
IF YOU CHECKED OFF ANY PROBLEMS ON THIS QUESTIONNAIRE, HOW DIFFICULT HAVE THESE PROBLEMS MADE IT FOR YOU TO DO YOUR WORK, TAKE CARE OF THINGS AT HOME, OR GET ALONG WITH OTHER PEOPLE: NOT DIFFICULT AT ALL
2. NOT BEING ABLE TO STOP OR CONTROL WORRYING: NOT AT ALL
5. BEING SO RESTLESS THAT IT IS HARD TO SIT STILL: SEVERAL DAYS
GAD7 TOTAL SCORE: 5
7. FEELING AFRAID AS IF SOMETHING AWFUL MIGHT HAPPEN: NOT AT ALL

## 2022-01-25 ASSESSMENT — MIFFLIN-ST. JEOR: SCORE: 1521.11

## 2022-01-25 ASSESSMENT — PATIENT HEALTH QUESTIONNAIRE - PHQ9
5. POOR APPETITE OR OVEREATING: SEVERAL DAYS
SUM OF ALL RESPONSES TO PHQ QUESTIONS 1-9: 3

## 2022-01-25 NOTE — PROGRESS NOTES
SUBJECTIVE:   CC: Vivien Quinn is an 31 year old woman who presents for preventive health visit.     Patient is  with 2 children, ages 3.5 and 18 months.  She is working outside of the home.    History of PCOS.  Her menstrual cycles are irregular.  She follows at [facility].  She believes her Pap is up-to-date.  Does complain of some bladder leakage.  She believes this is urine versus vaginal discharge.  She can also experience urinary frequency and incontinence with laughing/sneezing/coughing.  She denies any dysuria or hematuria.    Patient follows with psychiatry for treatment of ALBERTO.  She is on 50 mg of Zoloft.  States this is stable.    Patient has a history of GERD.  She has been on pantoprazole twice daily for many years.  She has recently been out of her medication, and her symptoms have been much worse.  She would like to resume.    No other questions or concerns today.    Healthy Habits:     Getting at least 3 servings of Calcium per day:  Yes    Bi-annual eye exam:  Yes    Dental care twice a year:  Yes    Sleep apnea or symptoms of sleep apnea:  None    Diet:  Regular (no restrictions)    Frequency of exercise:  None    Taking medications regularly:  Yes    Medication side effects:  None    PHQ-2 Total Score: 0      Today's PHQ-2 Score:   PHQ-2 ( 1999 [employer]) 1/22/2022   Q1: Little interest or pleasure in doing things 0   Q2: Feeling down, depressed or hopeless 0   PHQ-2 Score 0   Q1: Little interest or pleasure in doing things Not at all   Q2: Feeling down, depressed or hopeless Not at all   PHQ-2 Score 0       Abuse: Current or Past (Physical, Sexual or Emotional) - No  Do you feel safe in your environment? Yes        Social History     Tobacco Use     Smoking status: Never Smoker     Smokeless tobacco: Never Used   Substance Use Topics     Alcohol use: Yes     Alcohol/week: 3.3 standard drinks         Alcohol Use 1/22/2022   Prescreen: >3 drinks/day or >7 drinks/week? Yes  "  AUDIT SCORE  7       Reviewed orders with patient.  Reviewed health maintenance and updated orders accordingly - Yes      Breast Cancer Screening:    FHS-7:   Breast CA Risk Assessment (FHS-7) 1/22/2022   Did any of your first-degree relatives have breast or ovarian cancer? No   Did any of your relatives have bilateral breast cancer? No   Did any man in your family have breast cancer? No   Did any woman in your family have breast and ovarian cancer? No   Did any woman in your family have breast cancer before age 50 y? No   Do you have 2 or more relatives with breast and/or ovarian cancer? No   Do you have 2 or more relatives with breast and/or bowel cancer? No       History of abnormal Pap smear: NO - age 30-65 PAP every 5 years with negative HPV co-testing recommended     Reviewed and updated as needed this visit by clinical staff  Tobacco  Allergies  Meds  Problems  Med Hx  Surg Hx  Fam Hx         Reviewed and updated as needed this visit by Provider  Tobacco  Allergies  Meds  Problems  Med Hx  Surg Hx  Fam Hx            Review of Systems  Pertinent items in HPI     OBJECTIVE:   /60   Pulse 76   Ht 1.721 m (5' 7.75\")   Wt 76.7 kg (169 lb)   LMP 12/21/2021 (Approximate)   Breastfeeding No   BMI 25.89 kg/m    Physical Exam  GENERAL: healthy, alert and no distress  EYES: Eyes grossly normal to inspection, PERRL and conjunctivae and sclerae normal  HENT: ear canals and TM's normal, nose and mouth without ulcers or lesions  NECK: no adenopathy, no asymmetry, masses, or scars and thyroid normal to palpation  RESP: lungs clear to auscultation - no rales, rhonchi or wheezes  BREAST: normal without masses, tenderness or nipple discharge and no palpable axillary masses or adenopathy  CV: regular rate and rhythm, normal S1 S2, no S3 or S4, no murmur, click or rub, no peripheral edema  ABDOMEN: soft, nontender, no hepatosplenomegaly, no masses and bowel sounds normal  MS: no gross musculoskeletal " "defects noted, no edema  SKIN: no suspicious lesions or rashes  NEURO: Normal strength and tone, mentation intact and speech normal  PSYCH: mentation appears normal, affect normal/bright        ASSESSMENT/PLAN:   Vivien was seen today for physical.    Diagnoses and all orders for this visit:    Routine general medical examination at a health care facility    Mixed hyperlipidemia  -     Lipid Profile (Chol, Trig, HDL, LDL calc); Future    Screening for diabetes mellitus  -     Basic metabolic panel; Future    Female stress incontinence  Patient will also discuss this with her OBGYN. Recommend pelvic floor therapy; referral placed.   -     Physical Therapy Referral; Future    Gastroesophageal reflux disease without esophagitis  Significant symptoms while off the Pantoprazole.  Resume medication.   -     pantoprazole (PROTONIX) 40 MG EC tablet; TAKE 1 TABLET(40 MG) BY MOUTH TWICE DAILY    Generalized anxiety disorder  Well controlled on Sertraline. Managed by psychiatry.     Other orders  -     INFLUENZA VACCINE IM >6 MO VALENT IIV4 (ALFURIA/FLUZONE)  -     COVID-19,PF,PFIZER (12+ Yrs GRAY LABEL)  -     REVIEW OF HEALTH MAINTENANCE PROTOCOL ORDERS            COUNSELING:  Reviewed preventive health counseling, as reflected in patient instructions    Estimated body mass index is 25.89 kg/m  as calculated from the following:    Height as of this encounter: 1.721 m (5' 7.75\").    Weight as of this encounter: 76.7 kg (169 lb).        She reports that she has never smoked. She has never used smokeless tobacco.        Margie Euceda NP  [facility]  "

## 2022-01-26 ASSESSMENT — ANXIETY QUESTIONNAIRES: GAD7 TOTAL SCORE: 5

## 2022-01-28 ENCOUNTER — LAB (OUTPATIENT)
Dept: LAB | Facility: CLINIC | Age: 32
End: 2022-01-28
Payer: COMMERCIAL

## 2022-01-28 DIAGNOSIS — E78.2 MIXED HYPERLIPIDEMIA: ICD-10-CM

## 2022-01-28 DIAGNOSIS — Z13.1 SCREENING FOR DIABETES MELLITUS: ICD-10-CM

## 2022-01-28 LAB
ANION GAP SERPL CALCULATED.3IONS-SCNC: 11 MMOL/L (ref 5–18)
BUN SERPL-MCNC: 12 MG/DL (ref 8–22)
CALCIUM SERPL-MCNC: 9.4 MG/DL (ref 8.5–10.5)
CHLORIDE BLD-SCNC: 105 MMOL/L (ref 98–107)
CHOLEST SERPL-MCNC: 172 MG/DL
CO2 SERPL-SCNC: 20 MMOL/L (ref 22–31)
CREAT SERPL-MCNC: 0.75 MG/DL (ref 0.6–1.1)
FASTING STATUS PATIENT QL REPORTED: YES
GFR SERPL CREATININE-BSD FRML MDRD: >90 ML/MIN/1.73M2
GLUCOSE BLD-MCNC: 71 MG/DL (ref 70–125)
HDLC SERPL-MCNC: 63 MG/DL
LDLC SERPL CALC-MCNC: 100 MG/DL
POTASSIUM BLD-SCNC: 4.7 MMOL/L (ref 3.5–5)
SODIUM SERPL-SCNC: 136 MMOL/L (ref 136–145)
TRIGL SERPL-MCNC: 46 MG/DL

## 2022-01-28 PROCEDURE — 36415 COLL VENOUS BLD VENIPUNCTURE: CPT

## 2022-01-28 PROCEDURE — 80048 BASIC METABOLIC PNL TOTAL CA: CPT

## 2022-01-28 PROCEDURE — 80061 LIPID PANEL: CPT

## 2022-09-10 ENCOUNTER — HEALTH MAINTENANCE LETTER (OUTPATIENT)
Age: 32
End: 2022-09-10

## 2023-04-30 ENCOUNTER — HEALTH MAINTENANCE LETTER (OUTPATIENT)
Age: 33
End: 2023-04-30

## 2024-07-07 ENCOUNTER — HEALTH MAINTENANCE LETTER (OUTPATIENT)
Age: 34
End: 2024-07-07